# Patient Record
Sex: FEMALE | Race: WHITE | Employment: UNEMPLOYED | URBAN - METROPOLITAN AREA
[De-identification: names, ages, dates, MRNs, and addresses within clinical notes are randomized per-mention and may not be internally consistent; named-entity substitution may affect disease eponyms.]

---

## 2018-09-29 ENCOUNTER — APPOINTMENT (OUTPATIENT)
Dept: CT IMAGING | Age: 83
End: 2018-09-29
Payer: MEDICARE

## 2018-09-29 ENCOUNTER — HOSPITAL ENCOUNTER (EMERGENCY)
Age: 83
Discharge: NURSING, REHAB OR LONG-TERM CARE | End: 2018-09-29
Attending: EMERGENCY MEDICINE | Admitting: EMERGENCY MEDICINE
Payer: MEDICARE

## 2018-09-29 VITALS
RESPIRATION RATE: 16 BRPM | DIASTOLIC BLOOD PRESSURE: 61 MMHG | HEIGHT: 65 IN | WEIGHT: 150 LBS | BODY MASS INDEX: 24.99 KG/M2 | TEMPERATURE: 98.3 F | OXYGEN SATURATION: 95 % | SYSTOLIC BLOOD PRESSURE: 127 MMHG | HEART RATE: 71 BPM

## 2018-09-29 DIAGNOSIS — E80.6 HYPERBILIRUBINEMIA: Primary | ICD-10-CM

## 2018-09-29 LAB
A/G RATIO: 1 (ref 1.1–2.2)
ALBUMIN SERPL-MCNC: 3.1 G/DL (ref 3.4–5)
ALP BLD-CCNC: 253 U/L (ref 40–129)
ALT SERPL-CCNC: 131 U/L (ref 10–40)
ANION GAP SERPL CALCULATED.3IONS-SCNC: 12 MMOL/L (ref 3–16)
AST SERPL-CCNC: 101 U/L (ref 15–37)
BILIRUB SERPL-MCNC: 2.4 MG/DL (ref 0–1)
BUN BLDV-MCNC: 20 MG/DL (ref 7–20)
CALCIUM SERPL-MCNC: 8.4 MG/DL (ref 8.3–10.6)
CHLORIDE BLD-SCNC: 102 MMOL/L (ref 99–110)
CO2: 26 MMOL/L (ref 21–32)
CREAT SERPL-MCNC: 0.9 MG/DL (ref 0.6–1.2)
GFR AFRICAN AMERICAN: >60
GFR NON-AFRICAN AMERICAN: 58
GLOBULIN: 3.1 G/DL
GLUCOSE BLD-MCNC: 129 MG/DL (ref 70–99)
HCT VFR BLD CALC: 37.2 % (ref 36–48)
HEMOGLOBIN: 12.4 G/DL (ref 12–16)
LIPASE: 34 U/L (ref 13–60)
MCH RBC QN AUTO: 31.4 PG (ref 26–34)
MCHC RBC AUTO-ENTMCNC: 33.3 G/DL (ref 31–36)
MCV RBC AUTO: 94.3 FL (ref 80–100)
PDW BLD-RTO: 14.3 % (ref 12.4–15.4)
PLATELET # BLD: 210 K/UL (ref 135–450)
PMV BLD AUTO: 9.4 FL (ref 5–10.5)
POTASSIUM SERPL-SCNC: 3.3 MMOL/L (ref 3.5–5.1)
RBC # BLD: 3.94 M/UL (ref 4–5.2)
SODIUM BLD-SCNC: 140 MMOL/L (ref 136–145)
TOTAL PROTEIN: 6.2 G/DL (ref 6.4–8.2)
WBC # BLD: 7.4 K/UL (ref 4–11)

## 2018-09-29 PROCEDURE — 99285 EMERGENCY DEPT VISIT HI MDM: CPT

## 2018-09-29 PROCEDURE — 85027 COMPLETE CBC AUTOMATED: CPT

## 2018-09-29 PROCEDURE — 83690 ASSAY OF LIPASE: CPT

## 2018-09-29 PROCEDURE — 80053 COMPREHEN METABOLIC PANEL: CPT

## 2018-09-29 PROCEDURE — 80074 ACUTE HEPATITIS PANEL: CPT

## 2018-09-29 PROCEDURE — 74176 CT ABD & PELVIS W/O CONTRAST: CPT

## 2018-09-29 ASSESSMENT — PAIN SCALES - GENERAL
PAINLEVEL_OUTOF10: 4
PAINLEVEL_OUTOF10: 0

## 2018-09-29 ASSESSMENT — PAIN SCALES - WONG BAKER: WONGBAKER_NUMERICALRESPONSE: 0

## 2018-09-29 ASSESSMENT — PAIN DESCRIPTION - PAIN TYPE: TYPE: ACUTE PAIN

## 2018-09-29 NOTE — ED PROVIDER NOTES
Cholecystectomy. Common bowel duct is mildly dilated measuring up to 1.2 cm. High density material noted within the distal common bile duct which may represent tiny stones. GI/Bowel: No findings to suggest bowel obstruction. Appendix is not clearly visualized however no findings to suggest acute appendicitis. Pelvis: Streak artifact from left hip arthroplasty limits evaluation of the pelvis. No large fluid collection. No adenopathy. Vascular calcifications. Peritoneum/Retroperitoneum: No abdominal aortic aneurysm. Adrenal glands unremarkable. Right kidney is unremarkable. Left kidney is unremarkable. Bones/Soft Tissues: Osteopenia. Moderate severe degenerative changes at the right hip. Postsurgical changes from left hip arthroplasty. No evidence of fracture or dislocation of the left hip. Moderate disc height loss L5-S1. Mild-to-moderate disc height loss L1 through L4. Vacuum disc L1 through L5. Common bile duct is mildly dilated. Increased densities within the distal common bile duct which likely represent small stones. This can be better evaluated with MRCP. No acute findings otherwise.        Labs:  Results for orders placed or performed during the hospital encounter of 09/29/18   CBC   Result Value Ref Range    WBC 7.4 4.0 - 11.0 K/uL    RBC 3.94 (L) 4.00 - 5.20 M/uL    Hemoglobin 12.4 12.0 - 16.0 g/dL    Hematocrit 37.2 36.0 - 48.0 %    MCV 94.3 80.0 - 100.0 fL    MCH 31.4 26.0 - 34.0 pg    MCHC 33.3 31.0 - 36.0 g/dL    RDW 14.3 12.4 - 15.4 %    Platelets 760 162 - 562 K/uL    MPV 9.4 5.0 - 10.5 fL   Comprehensive metabolic panel   Result Value Ref Range    Sodium 140 136 - 145 mmol/L    Potassium 3.3 (L) 3.5 - 5.1 mmol/L    Chloride 102 99 - 110 mmol/L    CO2 26 21 - 32 mmol/L    Anion Gap 12 3 - 16    Glucose 129 (H) 70 - 99 mg/dL    BUN 20 7 - 20 mg/dL    CREATININE 0.9 0.6 - 1.2 mg/dL    GFR Non-African American 58 (A) >60    GFR African American >60 >60    Calcium 8.4 8.3 - 10.6 mg/dL

## 2018-09-30 LAB
HAV IGM SER IA-ACNC: NORMAL
HEPATITIS B CORE IGM ANTIBODY: NORMAL
HEPATITIS B SURFACE ANTIGEN INTERPRETATION: NORMAL
HEPATITIS C ANTIBODY INTERPRETATION: NORMAL

## 2019-10-28 ENCOUNTER — HOSPITAL ENCOUNTER (INPATIENT)
Age: 84
LOS: 7 days | Discharge: HOSPICE | DRG: 871 | End: 2019-11-04
Attending: EMERGENCY MEDICINE | Admitting: HOSPITALIST
Payer: MEDICARE

## 2019-10-28 ENCOUNTER — APPOINTMENT (OUTPATIENT)
Dept: GENERAL RADIOLOGY | Age: 84
DRG: 871 | End: 2019-10-28
Payer: MEDICARE

## 2019-10-28 ENCOUNTER — APPOINTMENT (OUTPATIENT)
Dept: CT IMAGING | Age: 84
DRG: 871 | End: 2019-10-28
Payer: MEDICARE

## 2019-10-28 DIAGNOSIS — N30.91 CYSTITIS WITH HEMATURIA: ICD-10-CM

## 2019-10-28 DIAGNOSIS — J96.02 ACUTE RESPIRATORY FAILURE WITH HYPOXIA AND HYPERCAPNIA (HCC): ICD-10-CM

## 2019-10-28 DIAGNOSIS — A41.9 SEPTICEMIA (HCC): Primary | ICD-10-CM

## 2019-10-28 DIAGNOSIS — Z51.5 HOSPICE CARE: ICD-10-CM

## 2019-10-28 DIAGNOSIS — J96.01 ACUTE RESPIRATORY FAILURE WITH HYPOXIA AND HYPERCAPNIA (HCC): ICD-10-CM

## 2019-10-28 LAB
A/G RATIO: 1.1 (ref 1.1–2.2)
ALBUMIN SERPL-MCNC: 4.1 G/DL (ref 3.4–5)
ALP BLD-CCNC: 165 U/L (ref 40–129)
ALT SERPL-CCNC: 33 U/L (ref 10–40)
AMORPHOUS: ABNORMAL /HPF
ANION GAP SERPL CALCULATED.3IONS-SCNC: 16 MMOL/L (ref 3–16)
AST SERPL-CCNC: 35 U/L (ref 15–37)
BACTERIA: ABNORMAL /HPF
BASE EXCESS VENOUS: -1.5 MMOL/L (ref -3–3)
BASOPHILS ABSOLUTE: 0.1 K/UL (ref 0–0.2)
BASOPHILS RELATIVE PERCENT: 0.7 %
BILIRUB SERPL-MCNC: 0.8 MG/DL (ref 0–1)
BILIRUBIN URINE: NEGATIVE
BLOOD, URINE: ABNORMAL
BUN BLDV-MCNC: 18 MG/DL (ref 7–20)
CALCIUM SERPL-MCNC: 9.5 MG/DL (ref 8.3–10.6)
CARBOXYHEMOGLOBIN: 2.2 % (ref 0–1.5)
CHLORIDE BLD-SCNC: 103 MMOL/L (ref 99–110)
CLARITY: ABNORMAL
CO2: 25 MMOL/L (ref 21–32)
COLOR: YELLOW
CREAT SERPL-MCNC: 1.1 MG/DL (ref 0.6–1.2)
CRYSTALS, UA: ABNORMAL /HPF
EKG ATRIAL RATE: 125 BPM
EKG ATRIAL RATE: 129 BPM
EKG DIAGNOSIS: NORMAL
EKG DIAGNOSIS: NORMAL
EKG Q-T INTERVAL: 306 MS
EKG Q-T INTERVAL: 346 MS
EKG QRS DURATION: 82 MS
EKG QRS DURATION: 88 MS
EKG QTC CALCULATION (BAZETT): 446 MS
EKG QTC CALCULATION (BAZETT): 502 MS
EKG R AXIS: -17 DEGREES
EKG R AXIS: -23 DEGREES
EKG T AXIS: 52 DEGREES
EKG T AXIS: 67 DEGREES
EKG VENTRICULAR RATE: 127 BPM
EKG VENTRICULAR RATE: 128 BPM
EOSINOPHILS ABSOLUTE: 0.1 K/UL (ref 0–0.6)
EOSINOPHILS RELATIVE PERCENT: 0.3 %
EPITHELIAL CELLS, UA: ABNORMAL /HPF
GFR AFRICAN AMERICAN: 56
GFR NON-AFRICAN AMERICAN: 46
GLOBULIN: 3.6 G/DL
GLUCOSE BLD-MCNC: 193 MG/DL (ref 70–99)
GLUCOSE URINE: NEGATIVE MG/DL
HCO3 VENOUS: 25.2 MMOL/L (ref 23–29)
HCT VFR BLD CALC: 44.1 % (ref 36–48)
HEMOGLOBIN: 14.6 G/DL (ref 12–16)
KETONES, URINE: 15 MG/DL
LACTIC ACID: 3.2 MMOL/L (ref 0.4–2)
LACTIC ACID: 3.5 MMOL/L (ref 0.4–2)
LEUKOCYTE ESTERASE, URINE: ABNORMAL
LYMPHOCYTES ABSOLUTE: 2.1 K/UL (ref 1–5.1)
LYMPHOCYTES RELATIVE PERCENT: 11.5 %
MCH RBC QN AUTO: 32.9 PG (ref 26–34)
MCHC RBC AUTO-ENTMCNC: 33 G/DL (ref 31–36)
MCV RBC AUTO: 99.6 FL (ref 80–100)
METHEMOGLOBIN VENOUS: 0.4 %
MICROSCOPIC EXAMINATION: YES
MONOCYTES ABSOLUTE: 1 K/UL (ref 0–1.3)
MONOCYTES RELATIVE PERCENT: 5.7 %
MUCUS: ABNORMAL /LPF
NEUTROPHILS ABSOLUTE: 15.2 K/UL (ref 1.7–7.7)
NEUTROPHILS RELATIVE PERCENT: 81.8 %
NITRITE, URINE: POSITIVE
O2 CONTENT, VEN: 15 VOL %
O2 SAT, VEN: 73 %
O2 THERAPY: ABNORMAL
PCO2, VEN: 50 MMHG (ref 40–50)
PDW BLD-RTO: 13.6 % (ref 12.4–15.4)
PH UA: 7 (ref 5–8)
PH VENOUS: 7.32 (ref 7.35–7.45)
PLATELET # BLD: 270 K/UL (ref 135–450)
PMV BLD AUTO: 9.9 FL (ref 5–10.5)
PO2, VEN: 42 MMHG (ref 25–40)
POTASSIUM REFLEX MAGNESIUM: 4.3 MMOL/L (ref 3.5–5.1)
PRO-BNP: 758 PG/ML (ref 0–449)
PROCALCITONIN: 0.12 NG/ML (ref 0–0.15)
PROTEIN UA: 100 MG/DL
RAPID INFLUENZA  B AGN: NEGATIVE
RAPID INFLUENZA A AGN: NEGATIVE
RBC # BLD: 4.43 M/UL (ref 4–5.2)
RBC UA: ABNORMAL /HPF (ref 0–2)
SODIUM BLD-SCNC: 144 MMOL/L (ref 136–145)
SPECIFIC GRAVITY UA: 1.02 (ref 1–1.03)
TCO2 CALC VENOUS: 27 MMOL/L
TOTAL PROTEIN: 7.7 G/DL (ref 6.4–8.2)
TROPONIN: <0.01 NG/ML
URINE REFLEX TO CULTURE: YES
URINE TYPE: ABNORMAL
UROBILINOGEN, URINE: 0.2 E.U./DL
WBC # BLD: 18.5 K/UL (ref 4–11)
WBC UA: ABNORMAL /HPF (ref 0–5)

## 2019-10-28 PROCEDURE — 85025 COMPLETE CBC W/AUTO DIFF WBC: CPT

## 2019-10-28 PROCEDURE — 84484 ASSAY OF TROPONIN QUANT: CPT

## 2019-10-28 PROCEDURE — 94640 AIRWAY INHALATION TREATMENT: CPT

## 2019-10-28 PROCEDURE — 96366 THER/PROPH/DIAG IV INF ADDON: CPT

## 2019-10-28 PROCEDURE — 87086 URINE CULTURE/COLONY COUNT: CPT

## 2019-10-28 PROCEDURE — 87186 SC STD MICRODIL/AGAR DIL: CPT

## 2019-10-28 PROCEDURE — 71045 X-RAY EXAM CHEST 1 VIEW: CPT

## 2019-10-28 PROCEDURE — 99223 1ST HOSP IP/OBS HIGH 75: CPT | Performed by: INTERNAL MEDICINE

## 2019-10-28 PROCEDURE — 93005 ELECTROCARDIOGRAM TRACING: CPT | Performed by: EMERGENCY MEDICINE

## 2019-10-28 PROCEDURE — 2580000003 HC RX 258: Performed by: INTERNAL MEDICINE

## 2019-10-28 PROCEDURE — 82803 BLOOD GASES ANY COMBINATION: CPT

## 2019-10-28 PROCEDURE — 2580000003 HC RX 258: Performed by: EMERGENCY MEDICINE

## 2019-10-28 PROCEDURE — 87040 BLOOD CULTURE FOR BACTERIA: CPT

## 2019-10-28 PROCEDURE — 2580000003 HC RX 258: Performed by: HOSPITALIST

## 2019-10-28 PROCEDURE — 94761 N-INVAS EAR/PLS OXIMETRY MLT: CPT

## 2019-10-28 PROCEDURE — 6370000000 HC RX 637 (ALT 250 FOR IP): Performed by: INTERNAL MEDICINE

## 2019-10-28 PROCEDURE — 80053 COMPREHEN METABOLIC PANEL: CPT

## 2019-10-28 PROCEDURE — 6370000000 HC RX 637 (ALT 250 FOR IP): Performed by: HOSPITALIST

## 2019-10-28 PROCEDURE — 6360000002 HC RX W HCPCS: Performed by: HOSPITALIST

## 2019-10-28 PROCEDURE — 87077 CULTURE AEROBIC IDENTIFY: CPT

## 2019-10-28 PROCEDURE — 2700000000 HC OXYGEN THERAPY PER DAY

## 2019-10-28 PROCEDURE — 94660 CPAP INITIATION&MGMT: CPT

## 2019-10-28 PROCEDURE — 6360000002 HC RX W HCPCS: Performed by: EMERGENCY MEDICINE

## 2019-10-28 PROCEDURE — 96365 THER/PROPH/DIAG IV INF INIT: CPT

## 2019-10-28 PROCEDURE — 6360000002 HC RX W HCPCS: Performed by: INTERNAL MEDICINE

## 2019-10-28 PROCEDURE — 93010 ELECTROCARDIOGRAM REPORT: CPT | Performed by: INTERNAL MEDICINE

## 2019-10-28 PROCEDURE — 71260 CT THORAX DX C+: CPT

## 2019-10-28 PROCEDURE — 2000000000 HC ICU R&B

## 2019-10-28 PROCEDURE — 84145 PROCALCITONIN (PCT): CPT

## 2019-10-28 PROCEDURE — 81001 URINALYSIS AUTO W/SCOPE: CPT

## 2019-10-28 PROCEDURE — 87804 INFLUENZA ASSAY W/OPTIC: CPT

## 2019-10-28 PROCEDURE — 96367 TX/PROPH/DG ADDL SEQ IV INF: CPT

## 2019-10-28 PROCEDURE — 83605 ASSAY OF LACTIC ACID: CPT

## 2019-10-28 PROCEDURE — 6370000000 HC RX 637 (ALT 250 FOR IP): Performed by: EMERGENCY MEDICINE

## 2019-10-28 PROCEDURE — 6360000004 HC RX CONTRAST MEDICATION: Performed by: EMERGENCY MEDICINE

## 2019-10-28 PROCEDURE — 83880 ASSAY OF NATRIURETIC PEPTIDE: CPT

## 2019-10-28 PROCEDURE — 99285 EMERGENCY DEPT VISIT HI MDM: CPT

## 2019-10-28 RX ORDER — BISACODYL 10 MG
10 SUPPOSITORY, RECTAL RECTAL DAILY
Status: DISCONTINUED | OUTPATIENT
Start: 2019-10-28 | End: 2019-11-04 | Stop reason: HOSPADM

## 2019-10-28 RX ORDER — GUAIFENESIN/DEXTROMETHORPHAN 100-10MG/5
5 SYRUP ORAL EVERY 4 HOURS PRN
Status: DISCONTINUED | OUTPATIENT
Start: 2019-10-28 | End: 2019-10-30

## 2019-10-28 RX ORDER — GUAIFENESIN AND DEXTROMETHORPHAN HYDROBROMIDE 100; 10 MG/5ML; MG/5ML
5 SOLUTION ORAL EVERY 4 HOURS PRN
Status: ON HOLD | COMMUNITY
End: 2019-11-04 | Stop reason: HOSPADM

## 2019-10-28 RX ORDER — PYRIDOXINE HCL (VITAMIN B6) 100 MG
500 TABLET ORAL DAILY
Status: DISCONTINUED | OUTPATIENT
Start: 2019-10-28 | End: 2019-10-28

## 2019-10-28 RX ORDER — PANTOPRAZOLE SODIUM 40 MG/1
40 TABLET, DELAYED RELEASE ORAL
Status: DISCONTINUED | OUTPATIENT
Start: 2019-10-28 | End: 2019-11-04 | Stop reason: HOSPADM

## 2019-10-28 RX ORDER — FERROUS SULFATE 325(65) MG
325 TABLET ORAL 2 TIMES DAILY WITH MEALS
Status: DISCONTINUED | OUTPATIENT
Start: 2019-10-28 | End: 2019-11-04 | Stop reason: HOSPADM

## 2019-10-28 RX ORDER — ACETAMINOPHEN 650 MG/1
650 SUPPOSITORY RECTAL ONCE
Status: COMPLETED | OUTPATIENT
Start: 2019-10-28 | End: 2019-10-28

## 2019-10-28 RX ORDER — CETIRIZINE HYDROCHLORIDE 10 MG/1
5 TABLET ORAL DAILY
Status: DISCONTINUED | OUTPATIENT
Start: 2019-10-28 | End: 2019-11-04 | Stop reason: HOSPADM

## 2019-10-28 RX ORDER — SODIUM CHLORIDE 9 MG/ML
INJECTION, SOLUTION INTRAVENOUS CONTINUOUS
Status: DISCONTINUED | OUTPATIENT
Start: 2019-10-28 | End: 2019-10-29

## 2019-10-28 RX ORDER — IPRATROPIUM BROMIDE AND ALBUTEROL SULFATE 2.5; .5 MG/3ML; MG/3ML
1 SOLUTION RESPIRATORY (INHALATION) EVERY 4 HOURS
Status: DISCONTINUED | OUTPATIENT
Start: 2019-10-28 | End: 2019-10-28

## 2019-10-28 RX ORDER — IPRATROPIUM BROMIDE AND ALBUTEROL SULFATE 2.5; .5 MG/3ML; MG/3ML
1 SOLUTION RESPIRATORY (INHALATION) EVERY 4 HOURS
Status: DISCONTINUED | OUTPATIENT
Start: 2019-10-28 | End: 2019-11-02

## 2019-10-28 RX ORDER — DONEPEZIL HYDROCHLORIDE 5 MG/1
10 TABLET, FILM COATED ORAL DAILY
Status: DISCONTINUED | OUTPATIENT
Start: 2019-10-28 | End: 2019-11-04 | Stop reason: HOSPADM

## 2019-10-28 RX ORDER — MULTIVITAMIN
TABLET ORAL
Status: ON HOLD | COMMUNITY
End: 2019-11-04 | Stop reason: HOSPADM

## 2019-10-28 RX ORDER — ACETAMINOPHEN 325 MG/1
650 TABLET ORAL EVERY 4 HOURS PRN
Status: DISCONTINUED | OUTPATIENT
Start: 2019-10-28 | End: 2019-11-04 | Stop reason: HOSPADM

## 2019-10-28 RX ORDER — SODIUM CHLORIDE 0.9 % (FLUSH) 0.9 %
10 SYRINGE (ML) INJECTION EVERY 12 HOURS SCHEDULED
Status: DISCONTINUED | OUTPATIENT
Start: 2019-10-28 | End: 2019-11-04 | Stop reason: HOSPADM

## 2019-10-28 RX ORDER — HYDRALAZINE HYDROCHLORIDE 20 MG/ML
10 INJECTION INTRAMUSCULAR; INTRAVENOUS EVERY 6 HOURS PRN
Status: DISCONTINUED | OUTPATIENT
Start: 2019-10-28 | End: 2019-11-04 | Stop reason: HOSPADM

## 2019-10-28 RX ORDER — ONDANSETRON 2 MG/ML
4 INJECTION INTRAMUSCULAR; INTRAVENOUS EVERY 6 HOURS PRN
Status: DISCONTINUED | OUTPATIENT
Start: 2019-10-28 | End: 2019-10-28

## 2019-10-28 RX ORDER — SODIUM CHLORIDE 0.9 % (FLUSH) 0.9 %
10 SYRINGE (ML) INJECTION PRN
Status: DISCONTINUED | OUTPATIENT
Start: 2019-10-28 | End: 2019-11-04 | Stop reason: HOSPADM

## 2019-10-28 RX ORDER — DIVALPROEX SODIUM 250 MG/1
250 TABLET, EXTENDED RELEASE ORAL NIGHTLY
Status: DISCONTINUED | OUTPATIENT
Start: 2019-10-28 | End: 2019-11-04 | Stop reason: HOSPADM

## 2019-10-28 RX ORDER — CITALOPRAM 20 MG/1
10 TABLET ORAL DAILY
Status: DISCONTINUED | OUTPATIENT
Start: 2019-10-28 | End: 2019-11-04 | Stop reason: HOSPADM

## 2019-10-28 RX ORDER — MIRTAZAPINE 15 MG/1
15 TABLET, FILM COATED ORAL NIGHTLY
Status: DISCONTINUED | OUTPATIENT
Start: 2019-10-28 | End: 2019-11-04 | Stop reason: HOSPADM

## 2019-10-28 RX ORDER — LEVOTHYROXINE SODIUM 0.12 MG/1
125 TABLET ORAL DAILY
Status: DISCONTINUED | OUTPATIENT
Start: 2019-10-28 | End: 2019-11-04 | Stop reason: HOSPADM

## 2019-10-28 RX ORDER — ACETAMINOPHEN 325 MG/1
650 TABLET ORAL ONCE
Status: DISCONTINUED | OUTPATIENT
Start: 2019-10-28 | End: 2019-10-28

## 2019-10-28 RX ORDER — 0.9 % SODIUM CHLORIDE 0.9 %
30 INTRAVENOUS SOLUTION INTRAVENOUS ONCE
Status: COMPLETED | OUTPATIENT
Start: 2019-10-28 | End: 2019-10-28

## 2019-10-28 RX ORDER — ATORVASTATIN CALCIUM 10 MG/1
10 TABLET, FILM COATED ORAL DAILY
Status: DISCONTINUED | OUTPATIENT
Start: 2019-10-28 | End: 2019-11-04 | Stop reason: HOSPADM

## 2019-10-28 RX ORDER — DOCUSATE SODIUM 100 MG/1
100 CAPSULE, LIQUID FILLED ORAL DAILY
Status: DISCONTINUED | OUTPATIENT
Start: 2019-10-28 | End: 2019-11-04 | Stop reason: HOSPADM

## 2019-10-28 RX ADMIN — IPRATROPIUM BROMIDE AND ALBUTEROL SULFATE 3 ML: .5; 3 SOLUTION RESPIRATORY (INHALATION) at 22:35

## 2019-10-28 RX ADMIN — DIVALPROEX SODIUM 250 MG: 250 TABLET, EXTENDED RELEASE ORAL at 21:48

## 2019-10-28 RX ADMIN — MIRTAZAPINE 15 MG: 15 TABLET, FILM COATED ORAL at 21:48

## 2019-10-28 RX ADMIN — IPRATROPIUM BROMIDE AND ALBUTEROL SULFATE 3 ML: .5; 3 SOLUTION RESPIRATORY (INHALATION) at 07:17

## 2019-10-28 RX ADMIN — AZITHROMYCIN DIHYDRATE 500 MG: 500 INJECTION, POWDER, LYOPHILIZED, FOR SOLUTION INTRAVENOUS at 04:39

## 2019-10-28 RX ADMIN — MUPIROCIN: 20 OINTMENT TOPICAL at 21:48

## 2019-10-28 RX ADMIN — CEFTRIAXONE SODIUM 1 G: 1 INJECTION, POWDER, FOR SOLUTION INTRAMUSCULAR; INTRAVENOUS at 04:07

## 2019-10-28 RX ADMIN — Medication 10 ML: at 12:35

## 2019-10-28 RX ADMIN — ACETAMINOPHEN 650 MG: 650 SUPPOSITORY RECTAL at 05:18

## 2019-10-28 RX ADMIN — ENOXAPARIN SODIUM 30 MG: 30 INJECTION SUBCUTANEOUS at 12:34

## 2019-10-28 RX ADMIN — IPRATROPIUM BROMIDE AND ALBUTEROL SULFATE 3 ML: .5; 3 SOLUTION RESPIRATORY (INHALATION) at 15:31

## 2019-10-28 RX ADMIN — SODIUM CHLORIDE: 9 INJECTION, SOLUTION INTRAVENOUS at 19:33

## 2019-10-28 RX ADMIN — Medication 10 ML: at 21:48

## 2019-10-28 RX ADMIN — SODIUM CHLORIDE: 9 INJECTION, SOLUTION INTRAVENOUS at 07:39

## 2019-10-28 RX ADMIN — IPRATROPIUM BROMIDE AND ALBUTEROL SULFATE 3 ML: .5; 3 SOLUTION RESPIRATORY (INHALATION) at 11:01

## 2019-10-28 RX ADMIN — IPRATROPIUM BROMIDE AND ALBUTEROL SULFATE 3 ML: .5; 3 SOLUTION RESPIRATORY (INHALATION) at 19:15

## 2019-10-28 RX ADMIN — SODIUM CHLORIDE 2274 ML: 9 INJECTION, SOLUTION INTRAVENOUS at 04:39

## 2019-10-28 RX ADMIN — CEFEPIME 2 G: 2 INJECTION, POWDER, FOR SOLUTION INTRAVENOUS at 12:34

## 2019-10-28 RX ADMIN — IOPAMIDOL 75 ML: 755 INJECTION, SOLUTION INTRAVENOUS at 05:40

## 2019-10-28 RX ADMIN — VANCOMYCIN HYDROCHLORIDE 1000 MG: 1 INJECTION, POWDER, LYOPHILIZED, FOR SOLUTION INTRAVENOUS at 14:00

## 2019-10-28 ASSESSMENT — PAIN SCALES - GENERAL
PAINLEVEL_OUTOF10: 0
PAINLEVEL_OUTOF10: 0
PAINLEVEL_OUTOF10: 8

## 2019-10-28 ASSESSMENT — PAIN DESCRIPTION - LOCATION: LOCATION: GENERALIZED

## 2019-10-28 ASSESSMENT — PAIN SCALES - WONG BAKER: WONGBAKER_NUMERICALRESPONSE: 8

## 2019-10-28 ASSESSMENT — PAIN DESCRIPTION - DESCRIPTORS: DESCRIPTORS: ACHING

## 2019-10-29 LAB
ALBUMIN SERPL-MCNC: 3.1 G/DL (ref 3.4–5)
ALP BLD-CCNC: 93 U/L (ref 40–129)
ALT SERPL-CCNC: 20 U/L (ref 10–40)
ANION GAP SERPL CALCULATED.3IONS-SCNC: 11 MMOL/L (ref 3–16)
AST SERPL-CCNC: 22 U/L (ref 15–37)
BASOPHILS ABSOLUTE: 0.1 K/UL (ref 0–0.2)
BASOPHILS RELATIVE PERCENT: 0.5 %
BILIRUB SERPL-MCNC: 0.9 MG/DL (ref 0–1)
BILIRUBIN DIRECT: 0.4 MG/DL (ref 0–0.3)
BILIRUBIN, INDIRECT: 0.5 MG/DL (ref 0–1)
BUN BLDV-MCNC: 10 MG/DL (ref 7–20)
CALCIUM SERPL-MCNC: 8.3 MG/DL (ref 8.3–10.6)
CHLORIDE BLD-SCNC: 103 MMOL/L (ref 99–110)
CO2: 21 MMOL/L (ref 21–32)
CREAT SERPL-MCNC: 0.8 MG/DL (ref 0.6–1.2)
EKG ATRIAL RATE: 107 BPM
EKG DIAGNOSIS: NORMAL
EKG P AXIS: 44 DEGREES
EKG P-R INTERVAL: 172 MS
EKG Q-T INTERVAL: 368 MS
EKG QRS DURATION: 86 MS
EKG QTC CALCULATION (BAZETT): 491 MS
EKG R AXIS: -26 DEGREES
EKG T AXIS: 32 DEGREES
EKG VENTRICULAR RATE: 107 BPM
EOSINOPHILS ABSOLUTE: 0.1 K/UL (ref 0–0.6)
EOSINOPHILS RELATIVE PERCENT: 0.9 %
GFR AFRICAN AMERICAN: >60
GFR NON-AFRICAN AMERICAN: >60
GLUCOSE BLD-MCNC: 126 MG/DL (ref 70–99)
HCT VFR BLD CALC: 37.2 % (ref 36–48)
HEMOGLOBIN: 12.2 G/DL (ref 12–16)
LYMPHOCYTES ABSOLUTE: 1 K/UL (ref 1–5.1)
LYMPHOCYTES RELATIVE PERCENT: 10.5 %
MAGNESIUM: 1.8 MG/DL (ref 1.8–2.4)
MCH RBC QN AUTO: 32.6 PG (ref 26–34)
MCHC RBC AUTO-ENTMCNC: 32.8 G/DL (ref 31–36)
MCV RBC AUTO: 99.4 FL (ref 80–100)
MONOCYTES ABSOLUTE: 0.7 K/UL (ref 0–1.3)
MONOCYTES RELATIVE PERCENT: 7.2 %
NEUTROPHILS ABSOLUTE: 8 K/UL (ref 1.7–7.7)
NEUTROPHILS RELATIVE PERCENT: 80.9 %
PDW BLD-RTO: 13.4 % (ref 12.4–15.4)
PHOSPHORUS: 2.3 MG/DL (ref 2.5–4.9)
PLATELET # BLD: 194 K/UL (ref 135–450)
PMV BLD AUTO: 9.3 FL (ref 5–10.5)
POTASSIUM REFLEX MAGNESIUM: 3.4 MMOL/L (ref 3.5–5.1)
POTASSIUM SERPL-SCNC: 3.4 MMOL/L (ref 3.5–5.1)
RBC # BLD: 3.74 M/UL (ref 4–5.2)
SODIUM BLD-SCNC: 135 MMOL/L (ref 136–145)
TOTAL PROTEIN: 6 G/DL (ref 6.4–8.2)
WBC # BLD: 9.9 K/UL (ref 4–11)

## 2019-10-29 PROCEDURE — 84100 ASSAY OF PHOSPHORUS: CPT

## 2019-10-29 PROCEDURE — 94640 AIRWAY INHALATION TREATMENT: CPT

## 2019-10-29 PROCEDURE — 99233 SBSQ HOSP IP/OBS HIGH 50: CPT | Performed by: INTERNAL MEDICINE

## 2019-10-29 PROCEDURE — 2580000003 HC RX 258: Performed by: INTERNAL MEDICINE

## 2019-10-29 PROCEDURE — 93005 ELECTROCARDIOGRAM TRACING: CPT | Performed by: INTERNAL MEDICINE

## 2019-10-29 PROCEDURE — 36415 COLL VENOUS BLD VENIPUNCTURE: CPT

## 2019-10-29 PROCEDURE — 80048 BASIC METABOLIC PNL TOTAL CA: CPT

## 2019-10-29 PROCEDURE — 80076 HEPATIC FUNCTION PANEL: CPT

## 2019-10-29 PROCEDURE — 6370000000 HC RX 637 (ALT 250 FOR IP): Performed by: HOSPITALIST

## 2019-10-29 PROCEDURE — 92610 EVALUATE SWALLOWING FUNCTION: CPT

## 2019-10-29 PROCEDURE — 94761 N-INVAS EAR/PLS OXIMETRY MLT: CPT

## 2019-10-29 PROCEDURE — 6360000002 HC RX W HCPCS: Performed by: HOSPITALIST

## 2019-10-29 PROCEDURE — 6360000002 HC RX W HCPCS: Performed by: INTERNAL MEDICINE

## 2019-10-29 PROCEDURE — 92526 ORAL FUNCTION THERAPY: CPT

## 2019-10-29 PROCEDURE — 2580000003 HC RX 258: Performed by: HOSPITALIST

## 2019-10-29 PROCEDURE — 6370000000 HC RX 637 (ALT 250 FOR IP): Performed by: INTERNAL MEDICINE

## 2019-10-29 PROCEDURE — 83735 ASSAY OF MAGNESIUM: CPT

## 2019-10-29 PROCEDURE — 1200000000 HC SEMI PRIVATE

## 2019-10-29 PROCEDURE — 2700000000 HC OXYGEN THERAPY PER DAY

## 2019-10-29 PROCEDURE — 99232 SBSQ HOSP IP/OBS MODERATE 35: CPT | Performed by: INTERNAL MEDICINE

## 2019-10-29 PROCEDURE — 85025 COMPLETE CBC W/AUTO DIFF WBC: CPT

## 2019-10-29 PROCEDURE — 93010 ELECTROCARDIOGRAM REPORT: CPT | Performed by: INTERNAL MEDICINE

## 2019-10-29 RX ORDER — LEVOFLOXACIN 5 MG/ML
500 INJECTION, SOLUTION INTRAVENOUS EVERY 24 HOURS
Status: DISCONTINUED | OUTPATIENT
Start: 2019-10-29 | End: 2019-10-30

## 2019-10-29 RX ORDER — SODIUM CHLORIDE 9 MG/ML
INJECTION, SOLUTION INTRAVENOUS CONTINUOUS
Status: DISCONTINUED | OUTPATIENT
Start: 2019-10-29 | End: 2019-10-30

## 2019-10-29 RX ADMIN — IPRATROPIUM BROMIDE AND ALBUTEROL SULFATE 3 ML: .5; 3 SOLUTION RESPIRATORY (INHALATION) at 07:18

## 2019-10-29 RX ADMIN — PANTOPRAZOLE SODIUM 40 MG: 40 TABLET, DELAYED RELEASE ORAL at 17:00

## 2019-10-29 RX ADMIN — PANTOPRAZOLE SODIUM 40 MG: 40 TABLET, DELAYED RELEASE ORAL at 06:25

## 2019-10-29 RX ADMIN — SODIUM CHLORIDE: 9 INJECTION, SOLUTION INTRAVENOUS at 04:30

## 2019-10-29 RX ADMIN — SODIUM CHLORIDE: 9 INJECTION, SOLUTION INTRAVENOUS at 14:48

## 2019-10-29 RX ADMIN — ENOXAPARIN SODIUM 30 MG: 30 INJECTION SUBCUTANEOUS at 07:33

## 2019-10-29 RX ADMIN — FERROUS SULFATE TAB 325 MG (65 MG ELEMENTAL FE) 325 MG: 325 (65 FE) TAB at 17:00

## 2019-10-29 RX ADMIN — MUPIROCIN: 20 OINTMENT TOPICAL at 07:33

## 2019-10-29 RX ADMIN — IPRATROPIUM BROMIDE AND ALBUTEROL SULFATE 3 ML: .5; 3 SOLUTION RESPIRATORY (INHALATION) at 15:57

## 2019-10-29 RX ADMIN — SODIUM CHLORIDE: 9 INJECTION, SOLUTION INTRAVENOUS at 17:01

## 2019-10-29 RX ADMIN — DIVALPROEX SODIUM 250 MG: 250 TABLET, EXTENDED RELEASE ORAL at 20:42

## 2019-10-29 RX ADMIN — IPRATROPIUM BROMIDE AND ALBUTEROL SULFATE 3 ML: .5; 3 SOLUTION RESPIRATORY (INHALATION) at 02:55

## 2019-10-29 RX ADMIN — Medication 10 ML: at 20:37

## 2019-10-29 RX ADMIN — MIRTAZAPINE 15 MG: 15 TABLET, FILM COATED ORAL at 20:42

## 2019-10-29 RX ADMIN — LEVOFLOXACIN 500 MG: 5 INJECTION, SOLUTION INTRAVENOUS at 11:39

## 2019-10-29 RX ADMIN — IPRATROPIUM BROMIDE AND ALBUTEROL SULFATE 3 ML: .5; 3 SOLUTION RESPIRATORY (INHALATION) at 19:22

## 2019-10-29 RX ADMIN — Medication 10 ML: at 07:33

## 2019-10-29 RX ADMIN — IPRATROPIUM BROMIDE AND ALBUTEROL SULFATE 3 ML: .5; 3 SOLUTION RESPIRATORY (INHALATION) at 11:12

## 2019-10-29 RX ADMIN — MUPIROCIN: 20 OINTMENT TOPICAL at 20:42

## 2019-10-29 ASSESSMENT — PAIN SCALES - GENERAL: PAINLEVEL_OUTOF10: 0

## 2019-10-30 ENCOUNTER — APPOINTMENT (OUTPATIENT)
Dept: GENERAL RADIOLOGY | Age: 84
DRG: 871 | End: 2019-10-30
Payer: MEDICARE

## 2019-10-30 LAB
ANION GAP SERPL CALCULATED.3IONS-SCNC: 11 MMOL/L (ref 3–16)
BASOPHILS ABSOLUTE: 0 K/UL (ref 0–0.2)
BASOPHILS RELATIVE PERCENT: 0.5 %
BUN BLDV-MCNC: 8 MG/DL (ref 7–20)
CALCIUM SERPL-MCNC: 8.4 MG/DL (ref 8.3–10.6)
CHLORIDE BLD-SCNC: 105 MMOL/L (ref 99–110)
CO2: 25 MMOL/L (ref 21–32)
CREAT SERPL-MCNC: 0.8 MG/DL (ref 0.6–1.2)
EOSINOPHILS ABSOLUTE: 0.1 K/UL (ref 0–0.6)
EOSINOPHILS RELATIVE PERCENT: 2.1 %
GFR AFRICAN AMERICAN: >60
GFR NON-AFRICAN AMERICAN: >60
GLUCOSE BLD-MCNC: 123 MG/DL (ref 70–99)
HCT VFR BLD CALC: 38.3 % (ref 36–48)
HEMOGLOBIN: 12.6 G/DL (ref 12–16)
LYMPHOCYTES ABSOLUTE: 0.8 K/UL (ref 1–5.1)
LYMPHOCYTES RELATIVE PERCENT: 11.8 %
MAGNESIUM: 1.8 MG/DL (ref 1.8–2.4)
MCH RBC QN AUTO: 32.5 PG (ref 26–34)
MCHC RBC AUTO-ENTMCNC: 32.9 G/DL (ref 31–36)
MCV RBC AUTO: 98.8 FL (ref 80–100)
MONOCYTES ABSOLUTE: 0.7 K/UL (ref 0–1.3)
MONOCYTES RELATIVE PERCENT: 10.6 %
NEUTROPHILS ABSOLUTE: 5.2 K/UL (ref 1.7–7.7)
NEUTROPHILS RELATIVE PERCENT: 75 %
PDW BLD-RTO: 13 % (ref 12.4–15.4)
PHOSPHORUS: 2 MG/DL (ref 2.5–4.9)
PLATELET # BLD: 208 K/UL (ref 135–450)
PMV BLD AUTO: 9 FL (ref 5–10.5)
POTASSIUM SERPL-SCNC: 3.8 MMOL/L (ref 3.5–5.1)
RBC # BLD: 3.88 M/UL (ref 4–5.2)
SODIUM BLD-SCNC: 141 MMOL/L (ref 136–145)
WBC # BLD: 6.9 K/UL (ref 4–11)

## 2019-10-30 PROCEDURE — 99232 SBSQ HOSP IP/OBS MODERATE 35: CPT | Performed by: INTERNAL MEDICINE

## 2019-10-30 PROCEDURE — 2700000000 HC OXYGEN THERAPY PER DAY

## 2019-10-30 PROCEDURE — 83735 ASSAY OF MAGNESIUM: CPT

## 2019-10-30 PROCEDURE — 2580000003 HC RX 258: Performed by: INTERNAL MEDICINE

## 2019-10-30 PROCEDURE — 80048 BASIC METABOLIC PNL TOTAL CA: CPT

## 2019-10-30 PROCEDURE — 97166 OT EVAL MOD COMPLEX 45 MIN: CPT

## 2019-10-30 PROCEDURE — 6370000000 HC RX 637 (ALT 250 FOR IP): Performed by: INTERNAL MEDICINE

## 2019-10-30 PROCEDURE — 99233 SBSQ HOSP IP/OBS HIGH 50: CPT | Performed by: INTERNAL MEDICINE

## 2019-10-30 PROCEDURE — 84100 ASSAY OF PHOSPHORUS: CPT

## 2019-10-30 PROCEDURE — 85025 COMPLETE CBC W/AUTO DIFF WBC: CPT

## 2019-10-30 PROCEDURE — 94669 MECHANICAL CHEST WALL OSCILL: CPT

## 2019-10-30 PROCEDURE — 94640 AIRWAY INHALATION TREATMENT: CPT

## 2019-10-30 PROCEDURE — 1200000000 HC SEMI PRIVATE

## 2019-10-30 PROCEDURE — 6360000002 HC RX W HCPCS: Performed by: INTERNAL MEDICINE

## 2019-10-30 PROCEDURE — 97530 THERAPEUTIC ACTIVITIES: CPT

## 2019-10-30 PROCEDURE — 36415 COLL VENOUS BLD VENIPUNCTURE: CPT

## 2019-10-30 PROCEDURE — 92526 ORAL FUNCTION THERAPY: CPT

## 2019-10-30 PROCEDURE — 97161 PT EVAL LOW COMPLEX 20 MIN: CPT

## 2019-10-30 PROCEDURE — 94761 N-INVAS EAR/PLS OXIMETRY MLT: CPT

## 2019-10-30 PROCEDURE — 71045 X-RAY EXAM CHEST 1 VIEW: CPT

## 2019-10-30 RX ORDER — GUAIFENESIN 600 MG/1
600 TABLET, EXTENDED RELEASE ORAL 2 TIMES DAILY
Status: DISCONTINUED | OUTPATIENT
Start: 2019-10-30 | End: 2019-11-04 | Stop reason: HOSPADM

## 2019-10-30 RX ORDER — FUROSEMIDE 10 MG/ML
40 INJECTION INTRAMUSCULAR; INTRAVENOUS ONCE
Status: COMPLETED | OUTPATIENT
Start: 2019-10-30 | End: 2019-10-30

## 2019-10-30 RX ORDER — LEVOFLOXACIN 500 MG/1
500 TABLET, FILM COATED ORAL DAILY
Status: DISCONTINUED | OUTPATIENT
Start: 2019-10-31 | End: 2019-10-31

## 2019-10-30 RX ADMIN — IPRATROPIUM BROMIDE AND ALBUTEROL SULFATE 3 ML: .5; 3 SOLUTION RESPIRATORY (INHALATION) at 19:25

## 2019-10-30 RX ADMIN — MUPIROCIN: 20 OINTMENT TOPICAL at 09:12

## 2019-10-30 RX ADMIN — Medication 10 ML: at 09:13

## 2019-10-30 RX ADMIN — CETIRIZINE HYDROCHLORIDE 5 MG: 10 TABLET ORAL at 09:13

## 2019-10-30 RX ADMIN — FERROUS SULFATE TAB 325 MG (65 MG ELEMENTAL FE) 325 MG: 325 (65 FE) TAB at 16:54

## 2019-10-30 RX ADMIN — DIVALPROEX SODIUM 250 MG: 250 TABLET, EXTENDED RELEASE ORAL at 21:34

## 2019-10-30 RX ADMIN — LEVOFLOXACIN 500 MG: 5 INJECTION, SOLUTION INTRAVENOUS at 11:53

## 2019-10-30 RX ADMIN — IPRATROPIUM BROMIDE AND ALBUTEROL SULFATE 3 ML: .5; 3 SOLUTION RESPIRATORY (INHALATION) at 07:27

## 2019-10-30 RX ADMIN — Medication 10 ML: at 21:34

## 2019-10-30 RX ADMIN — GUAIFENESIN 600 MG: 600 TABLET, EXTENDED RELEASE ORAL at 09:20

## 2019-10-30 RX ADMIN — ENOXAPARIN SODIUM 30 MG: 30 INJECTION SUBCUTANEOUS at 09:16

## 2019-10-30 RX ADMIN — ATORVASTATIN CALCIUM 10 MG: 10 TABLET, FILM COATED ORAL at 09:13

## 2019-10-30 RX ADMIN — PANTOPRAZOLE SODIUM 40 MG: 40 TABLET, DELAYED RELEASE ORAL at 06:30

## 2019-10-30 RX ADMIN — DOCUSATE SODIUM 100 MG: 100 CAPSULE, LIQUID FILLED ORAL at 09:13

## 2019-10-30 RX ADMIN — SODIUM CHLORIDE: 9 INJECTION, SOLUTION INTRAVENOUS at 00:37

## 2019-10-30 RX ADMIN — GUAIFENESIN 600 MG: 600 TABLET, EXTENDED RELEASE ORAL at 21:34

## 2019-10-30 RX ADMIN — IPRATROPIUM BROMIDE AND ALBUTEROL SULFATE 3 ML: .5; 3 SOLUTION RESPIRATORY (INHALATION) at 14:54

## 2019-10-30 RX ADMIN — FUROSEMIDE 40 MG: 10 INJECTION, SOLUTION INTRAMUSCULAR; INTRAVENOUS at 14:30

## 2019-10-30 RX ADMIN — IPRATROPIUM BROMIDE AND ALBUTEROL SULFATE 3 ML: .5; 3 SOLUTION RESPIRATORY (INHALATION) at 11:02

## 2019-10-30 RX ADMIN — LEVOTHYROXINE SODIUM 125 MCG: 125 TABLET ORAL at 09:12

## 2019-10-30 RX ADMIN — MIRTAZAPINE 15 MG: 15 TABLET, FILM COATED ORAL at 21:34

## 2019-10-30 RX ADMIN — IPRATROPIUM BROMIDE AND ALBUTEROL SULFATE 3 ML: .5; 3 SOLUTION RESPIRATORY (INHALATION) at 00:00

## 2019-10-30 RX ADMIN — BISACODYL 10 MG: 10 SUPPOSITORY RECTAL at 09:12

## 2019-10-30 RX ADMIN — IPRATROPIUM BROMIDE AND ALBUTEROL SULFATE 3 ML: .5; 3 SOLUTION RESPIRATORY (INHALATION) at 02:46

## 2019-10-30 RX ADMIN — DONEPEZIL HYDROCHLORIDE 10 MG: 5 TABLET, FILM COATED ORAL at 09:13

## 2019-10-30 RX ADMIN — IPRATROPIUM BROMIDE AND ALBUTEROL SULFATE 3 ML: .5; 3 SOLUTION RESPIRATORY (INHALATION) at 22:51

## 2019-10-30 RX ADMIN — CITALOPRAM HYDROBROMIDE 10 MG: 20 TABLET ORAL at 09:12

## 2019-10-30 RX ADMIN — PANTOPRAZOLE SODIUM 40 MG: 40 TABLET, DELAYED RELEASE ORAL at 16:54

## 2019-10-30 RX ADMIN — FERROUS SULFATE TAB 325 MG (65 MG ELEMENTAL FE) 325 MG: 325 (65 FE) TAB at 09:13

## 2019-10-31 ENCOUNTER — APPOINTMENT (OUTPATIENT)
Dept: GENERAL RADIOLOGY | Age: 84
DRG: 871 | End: 2019-10-31
Payer: MEDICARE

## 2019-10-31 LAB
ANION GAP SERPL CALCULATED.3IONS-SCNC: 13 MMOL/L (ref 3–16)
BASOPHILS ABSOLUTE: 0 K/UL (ref 0–0.2)
BASOPHILS RELATIVE PERCENT: 0.4 %
BUN BLDV-MCNC: 21 MG/DL (ref 7–20)
CALCIUM SERPL-MCNC: 8.3 MG/DL (ref 8.3–10.6)
CHLORIDE BLD-SCNC: 106 MMOL/L (ref 99–110)
CO2: 22 MMOL/L (ref 21–32)
CREAT SERPL-MCNC: 1.1 MG/DL (ref 0.6–1.2)
EOSINOPHILS ABSOLUTE: 0.2 K/UL (ref 0–0.6)
EOSINOPHILS RELATIVE PERCENT: 1.5 %
GFR AFRICAN AMERICAN: 56
GFR NON-AFRICAN AMERICAN: 46
GLUCOSE BLD-MCNC: 125 MG/DL (ref 70–99)
HCT VFR BLD CALC: 37.2 % (ref 36–48)
HEMOGLOBIN: 12.4 G/DL (ref 12–16)
LYMPHOCYTES ABSOLUTE: 0.9 K/UL (ref 1–5.1)
LYMPHOCYTES RELATIVE PERCENT: 8.4 %
MAGNESIUM: 2 MG/DL (ref 1.8–2.4)
MCH RBC QN AUTO: 32.7 PG (ref 26–34)
MCHC RBC AUTO-ENTMCNC: 33.2 G/DL (ref 31–36)
MCV RBC AUTO: 98.6 FL (ref 80–100)
MONOCYTES ABSOLUTE: 1 K/UL (ref 0–1.3)
MONOCYTES RELATIVE PERCENT: 9.7 %
NEUTROPHILS ABSOLUTE: 8.3 K/UL (ref 1.7–7.7)
NEUTROPHILS RELATIVE PERCENT: 80 %
ORGANISM: ABNORMAL
PDW BLD-RTO: 13.3 % (ref 12.4–15.4)
PHOSPHORUS: 2.4 MG/DL (ref 2.5–4.9)
PLATELET # BLD: 241 K/UL (ref 135–450)
PMV BLD AUTO: 9.1 FL (ref 5–10.5)
POTASSIUM SERPL-SCNC: 3.3 MMOL/L (ref 3.5–5.1)
RBC # BLD: 3.78 M/UL (ref 4–5.2)
SODIUM BLD-SCNC: 141 MMOL/L (ref 136–145)
URINE CULTURE, ROUTINE: ABNORMAL
WBC # BLD: 10.3 K/UL (ref 4–11)

## 2019-10-31 PROCEDURE — 6370000000 HC RX 637 (ALT 250 FOR IP): Performed by: INTERNAL MEDICINE

## 2019-10-31 PROCEDURE — 6360000002 HC RX W HCPCS: Performed by: INTERNAL MEDICINE

## 2019-10-31 PROCEDURE — 2580000003 HC RX 258: Performed by: INTERNAL MEDICINE

## 2019-10-31 PROCEDURE — 94669 MECHANICAL CHEST WALL OSCILL: CPT

## 2019-10-31 PROCEDURE — 36415 COLL VENOUS BLD VENIPUNCTURE: CPT

## 2019-10-31 PROCEDURE — 85025 COMPLETE CBC W/AUTO DIFF WBC: CPT

## 2019-10-31 PROCEDURE — 71045 X-RAY EXAM CHEST 1 VIEW: CPT

## 2019-10-31 PROCEDURE — 1200000000 HC SEMI PRIVATE

## 2019-10-31 PROCEDURE — 99232 SBSQ HOSP IP/OBS MODERATE 35: CPT | Performed by: INTERNAL MEDICINE

## 2019-10-31 PROCEDURE — 83735 ASSAY OF MAGNESIUM: CPT

## 2019-10-31 PROCEDURE — 84100 ASSAY OF PHOSPHORUS: CPT

## 2019-10-31 PROCEDURE — 94640 AIRWAY INHALATION TREATMENT: CPT

## 2019-10-31 PROCEDURE — 94761 N-INVAS EAR/PLS OXIMETRY MLT: CPT

## 2019-10-31 PROCEDURE — 2700000000 HC OXYGEN THERAPY PER DAY

## 2019-10-31 PROCEDURE — 80048 BASIC METABOLIC PNL TOTAL CA: CPT

## 2019-10-31 PROCEDURE — 99233 SBSQ HOSP IP/OBS HIGH 50: CPT | Performed by: INTERNAL MEDICINE

## 2019-10-31 RX ADMIN — Medication 10 ML: at 22:12

## 2019-10-31 RX ADMIN — IPRATROPIUM BROMIDE AND ALBUTEROL SULFATE 3 ML: .5; 3 SOLUTION RESPIRATORY (INHALATION) at 11:32

## 2019-10-31 RX ADMIN — DIVALPROEX SODIUM 250 MG: 250 TABLET, EXTENDED RELEASE ORAL at 22:07

## 2019-10-31 RX ADMIN — ATORVASTATIN CALCIUM 10 MG: 10 TABLET, FILM COATED ORAL at 10:03

## 2019-10-31 RX ADMIN — IPRATROPIUM BROMIDE AND ALBUTEROL SULFATE 3 ML: .5; 3 SOLUTION RESPIRATORY (INHALATION) at 07:25

## 2019-10-31 RX ADMIN — FERROUS SULFATE TAB 325 MG (65 MG ELEMENTAL FE) 325 MG: 325 (65 FE) TAB at 10:02

## 2019-10-31 RX ADMIN — CITALOPRAM HYDROBROMIDE 10 MG: 20 TABLET ORAL at 10:04

## 2019-10-31 RX ADMIN — ENOXAPARIN SODIUM 30 MG: 30 INJECTION SUBCUTANEOUS at 10:02

## 2019-10-31 RX ADMIN — CETIRIZINE HYDROCHLORIDE 5 MG: 10 TABLET ORAL at 10:03

## 2019-10-31 RX ADMIN — FERROUS SULFATE TAB 325 MG (65 MG ELEMENTAL FE) 325 MG: 325 (65 FE) TAB at 16:29

## 2019-10-31 RX ADMIN — Medication 10 ML: at 10:02

## 2019-10-31 RX ADMIN — IPRATROPIUM BROMIDE AND ALBUTEROL SULFATE 3 ML: .5; 3 SOLUTION RESPIRATORY (INHALATION) at 19:15

## 2019-10-31 RX ADMIN — GUAIFENESIN 600 MG: 600 TABLET, EXTENDED RELEASE ORAL at 22:06

## 2019-10-31 RX ADMIN — LEVOTHYROXINE SODIUM 125 MCG: 125 TABLET ORAL at 10:02

## 2019-10-31 RX ADMIN — ALBUTEROL SULFATE 2.5 MG: 2.5 SOLUTION RESPIRATORY (INHALATION) at 09:35

## 2019-10-31 RX ADMIN — MIRTAZAPINE 15 MG: 15 TABLET, FILM COATED ORAL at 22:07

## 2019-10-31 RX ADMIN — PANTOPRAZOLE SODIUM 40 MG: 40 TABLET, DELAYED RELEASE ORAL at 06:33

## 2019-10-31 RX ADMIN — IPRATROPIUM BROMIDE AND ALBUTEROL SULFATE 3 ML: .5; 3 SOLUTION RESPIRATORY (INHALATION) at 15:17

## 2019-10-31 RX ADMIN — PANTOPRAZOLE SODIUM 40 MG: 40 TABLET, DELAYED RELEASE ORAL at 16:29

## 2019-10-31 RX ADMIN — MEROPENEM 1 G: 1 INJECTION, POWDER, FOR SOLUTION INTRAVENOUS at 13:35

## 2019-10-31 RX ADMIN — GUAIFENESIN 600 MG: 600 TABLET, EXTENDED RELEASE ORAL at 10:03

## 2019-10-31 RX ADMIN — DOCUSATE SODIUM 100 MG: 100 CAPSULE, LIQUID FILLED ORAL at 10:02

## 2019-10-31 RX ADMIN — MUPIROCIN: 20 OINTMENT TOPICAL at 10:27

## 2019-10-31 RX ADMIN — IPRATROPIUM BROMIDE AND ALBUTEROL SULFATE 3 ML: .5; 3 SOLUTION RESPIRATORY (INHALATION) at 02:29

## 2019-10-31 RX ADMIN — LEVOFLOXACIN 500 MG: 500 TABLET, FILM COATED ORAL at 10:03

## 2019-10-31 RX ADMIN — DONEPEZIL HYDROCHLORIDE 10 MG: 5 TABLET, FILM COATED ORAL at 10:03

## 2019-10-31 RX ADMIN — MUPIROCIN: 20 OINTMENT TOPICAL at 22:12

## 2019-10-31 RX ADMIN — IPRATROPIUM BROMIDE AND ALBUTEROL SULFATE 3 ML: .5; 3 SOLUTION RESPIRATORY (INHALATION) at 23:04

## 2019-11-01 LAB
ANION GAP SERPL CALCULATED.3IONS-SCNC: 9 MMOL/L (ref 3–16)
BASOPHILS ABSOLUTE: 0 K/UL (ref 0–0.2)
BASOPHILS RELATIVE PERCENT: 0.4 %
BUN BLDV-MCNC: 23 MG/DL (ref 7–20)
CALCIUM SERPL-MCNC: 8.3 MG/DL (ref 8.3–10.6)
CHLORIDE BLD-SCNC: 104 MMOL/L (ref 99–110)
CO2: 26 MMOL/L (ref 21–32)
CREAT SERPL-MCNC: 0.9 MG/DL (ref 0.6–1.2)
EOSINOPHILS ABSOLUTE: 0.3 K/UL (ref 0–0.6)
EOSINOPHILS RELATIVE PERCENT: 2.7 %
GFR AFRICAN AMERICAN: >60
GFR NON-AFRICAN AMERICAN: 58
GLUCOSE BLD-MCNC: 105 MG/DL (ref 70–99)
HCT VFR BLD CALC: 36.2 % (ref 36–48)
HEMOGLOBIN: 12 G/DL (ref 12–16)
LYMPHOCYTES ABSOLUTE: 0.9 K/UL (ref 1–5.1)
LYMPHOCYTES RELATIVE PERCENT: 8.8 %
MAGNESIUM: 2.1 MG/DL (ref 1.8–2.4)
MCH RBC QN AUTO: 32.7 PG (ref 26–34)
MCHC RBC AUTO-ENTMCNC: 33.2 G/DL (ref 31–36)
MCV RBC AUTO: 98.6 FL (ref 80–100)
MONOCYTES ABSOLUTE: 1.2 K/UL (ref 0–1.3)
MONOCYTES RELATIVE PERCENT: 11.9 %
NEUTROPHILS ABSOLUTE: 7.9 K/UL (ref 1.7–7.7)
NEUTROPHILS RELATIVE PERCENT: 76.2 %
PDW BLD-RTO: 13.2 % (ref 12.4–15.4)
PHOSPHORUS: 2.3 MG/DL (ref 2.5–4.9)
PLATELET # BLD: 234 K/UL (ref 135–450)
PMV BLD AUTO: 8.9 FL (ref 5–10.5)
POTASSIUM SERPL-SCNC: 3.1 MMOL/L (ref 3.5–5.1)
RBC # BLD: 3.67 M/UL (ref 4–5.2)
SODIUM BLD-SCNC: 139 MMOL/L (ref 136–145)
WBC # BLD: 10.4 K/UL (ref 4–11)

## 2019-11-01 PROCEDURE — 99232 SBSQ HOSP IP/OBS MODERATE 35: CPT | Performed by: INTERNAL MEDICINE

## 2019-11-01 PROCEDURE — 6370000000 HC RX 637 (ALT 250 FOR IP): Performed by: INTERNAL MEDICINE

## 2019-11-01 PROCEDURE — 80048 BASIC METABOLIC PNL TOTAL CA: CPT

## 2019-11-01 PROCEDURE — 1200000000 HC SEMI PRIVATE

## 2019-11-01 PROCEDURE — 6360000002 HC RX W HCPCS: Performed by: INTERNAL MEDICINE

## 2019-11-01 PROCEDURE — 2580000003 HC RX 258: Performed by: INTERNAL MEDICINE

## 2019-11-01 PROCEDURE — 83735 ASSAY OF MAGNESIUM: CPT

## 2019-11-01 PROCEDURE — 36415 COLL VENOUS BLD VENIPUNCTURE: CPT

## 2019-11-01 PROCEDURE — 92526 ORAL FUNCTION THERAPY: CPT

## 2019-11-01 PROCEDURE — 84100 ASSAY OF PHOSPHORUS: CPT

## 2019-11-01 PROCEDURE — 99233 SBSQ HOSP IP/OBS HIGH 50: CPT | Performed by: INTERNAL MEDICINE

## 2019-11-01 PROCEDURE — 94669 MECHANICAL CHEST WALL OSCILL: CPT

## 2019-11-01 PROCEDURE — 94761 N-INVAS EAR/PLS OXIMETRY MLT: CPT

## 2019-11-01 PROCEDURE — 85025 COMPLETE CBC W/AUTO DIFF WBC: CPT

## 2019-11-01 PROCEDURE — 94640 AIRWAY INHALATION TREATMENT: CPT

## 2019-11-01 PROCEDURE — 2700000000 HC OXYGEN THERAPY PER DAY

## 2019-11-01 RX ORDER — POTASSIUM CHLORIDE 7.45 MG/ML
10 INJECTION INTRAVENOUS
Status: DISPENSED | OUTPATIENT
Start: 2019-11-01 | End: 2019-11-01

## 2019-11-01 RX ORDER — SODIUM CHLORIDE 9 MG/ML
INJECTION, SOLUTION INTRAVENOUS
Status: DISPENSED
Start: 2019-11-01 | End: 2019-11-01

## 2019-11-01 RX ORDER — POTASSIUM CHLORIDE 7.45 MG/ML
10 INJECTION INTRAVENOUS
Status: DISCONTINUED | OUTPATIENT
Start: 2019-11-01 | End: 2019-11-01 | Stop reason: SDUPTHER

## 2019-11-01 RX ORDER — POTASSIUM CHLORIDE 7.45 MG/ML
10 INJECTION INTRAVENOUS
Status: COMPLETED | OUTPATIENT
Start: 2019-11-01 | End: 2019-11-01

## 2019-11-01 RX ADMIN — POTASSIUM CHLORIDE 10 MEQ: 7.46 INJECTION, SOLUTION INTRAVENOUS at 14:38

## 2019-11-01 RX ADMIN — IPRATROPIUM BROMIDE AND ALBUTEROL SULFATE 3 ML: .5; 3 SOLUTION RESPIRATORY (INHALATION) at 15:45

## 2019-11-01 RX ADMIN — MEROPENEM 1 G: 1 INJECTION, POWDER, FOR SOLUTION INTRAVENOUS at 00:30

## 2019-11-01 RX ADMIN — PANTOPRAZOLE SODIUM 40 MG: 40 TABLET, DELAYED RELEASE ORAL at 06:22

## 2019-11-01 RX ADMIN — IPRATROPIUM BROMIDE AND ALBUTEROL SULFATE 3 ML: .5; 3 SOLUTION RESPIRATORY (INHALATION) at 02:48

## 2019-11-01 RX ADMIN — IPRATROPIUM BROMIDE AND ALBUTEROL SULFATE 3 ML: .5; 3 SOLUTION RESPIRATORY (INHALATION) at 20:22

## 2019-11-01 RX ADMIN — MEROPENEM 1 G: 1 INJECTION, POWDER, FOR SOLUTION INTRAVENOUS at 23:52

## 2019-11-01 RX ADMIN — IPRATROPIUM BROMIDE AND ALBUTEROL SULFATE 3 ML: .5; 3 SOLUTION RESPIRATORY (INHALATION) at 07:35

## 2019-11-01 RX ADMIN — MIRTAZAPINE 15 MG: 15 TABLET, FILM COATED ORAL at 20:43

## 2019-11-01 RX ADMIN — Medication 10 ML: at 20:43

## 2019-11-01 RX ADMIN — MUPIROCIN: 20 OINTMENT TOPICAL at 20:48

## 2019-11-01 RX ADMIN — POTASSIUM CHLORIDE 10 MEQ: 7.46 INJECTION, SOLUTION INTRAVENOUS at 11:49

## 2019-11-01 RX ADMIN — MEROPENEM 1 G: 1 INJECTION, POWDER, FOR SOLUTION INTRAVENOUS at 11:09

## 2019-11-01 RX ADMIN — DIVALPROEX SODIUM 250 MG: 250 TABLET, EXTENDED RELEASE ORAL at 20:43

## 2019-11-01 RX ADMIN — POTASSIUM CHLORIDE 10 MEQ: 7.46 INJECTION, SOLUTION INTRAVENOUS at 18:31

## 2019-11-01 RX ADMIN — IPRATROPIUM BROMIDE AND ALBUTEROL SULFATE 3 ML: .5; 3 SOLUTION RESPIRATORY (INHALATION) at 11:15

## 2019-11-01 RX ADMIN — POTASSIUM CHLORIDE 10 MEQ: 7.46 INJECTION, SOLUTION INTRAVENOUS at 16:27

## 2019-11-01 ASSESSMENT — PAIN SCALES - GENERAL: PAINLEVEL_OUTOF10: 0

## 2019-11-02 LAB
BLOOD CULTURE, ROUTINE: NORMAL
CULTURE, BLOOD 2: NORMAL
GLUCOSE BLD-MCNC: 76 MG/DL (ref 70–99)
GLUCOSE BLD-MCNC: 86 MG/DL (ref 70–99)
PERFORMED ON: NORMAL
PERFORMED ON: NORMAL

## 2019-11-02 PROCEDURE — 6360000002 HC RX W HCPCS: Performed by: INTERNAL MEDICINE

## 2019-11-02 PROCEDURE — 94761 N-INVAS EAR/PLS OXIMETRY MLT: CPT

## 2019-11-02 PROCEDURE — 2580000003 HC RX 258: Performed by: INTERNAL MEDICINE

## 2019-11-02 PROCEDURE — 1200000000 HC SEMI PRIVATE

## 2019-11-02 PROCEDURE — 92526 ORAL FUNCTION THERAPY: CPT

## 2019-11-02 PROCEDURE — 6370000000 HC RX 637 (ALT 250 FOR IP): Performed by: INTERNAL MEDICINE

## 2019-11-02 PROCEDURE — 99233 SBSQ HOSP IP/OBS HIGH 50: CPT | Performed by: INTERNAL MEDICINE

## 2019-11-02 PROCEDURE — 2700000000 HC OXYGEN THERAPY PER DAY

## 2019-11-02 PROCEDURE — 94640 AIRWAY INHALATION TREATMENT: CPT

## 2019-11-02 RX ORDER — DEXTROSE MONOHYDRATE 25 G/50ML
12.5 INJECTION, SOLUTION INTRAVENOUS PRN
Status: DISCONTINUED | OUTPATIENT
Start: 2019-11-02 | End: 2019-11-04 | Stop reason: HOSPADM

## 2019-11-02 RX ORDER — IPRATROPIUM BROMIDE AND ALBUTEROL SULFATE 2.5; .5 MG/3ML; MG/3ML
1 SOLUTION RESPIRATORY (INHALATION)
Status: DISCONTINUED | OUTPATIENT
Start: 2019-11-02 | End: 2019-11-04

## 2019-11-02 RX ORDER — NICOTINE POLACRILEX 4 MG
15 LOZENGE BUCCAL PRN
Status: DISCONTINUED | OUTPATIENT
Start: 2019-11-02 | End: 2019-11-04 | Stop reason: HOSPADM

## 2019-11-02 RX ORDER — DEXTROSE MONOHYDRATE 50 MG/ML
100 INJECTION, SOLUTION INTRAVENOUS PRN
Status: DISCONTINUED | OUTPATIENT
Start: 2019-11-02 | End: 2019-11-04 | Stop reason: HOSPADM

## 2019-11-02 RX ADMIN — IPRATROPIUM BROMIDE AND ALBUTEROL SULFATE 3 ML: .5; 3 SOLUTION RESPIRATORY (INHALATION) at 15:41

## 2019-11-02 RX ADMIN — Medication 10 ML: at 21:09

## 2019-11-02 RX ADMIN — HYDRALAZINE HYDROCHLORIDE 10 MG: 20 INJECTION INTRAMUSCULAR; INTRAVENOUS at 21:04

## 2019-11-02 RX ADMIN — Medication 10 ML: at 12:54

## 2019-11-02 RX ADMIN — IPRATROPIUM BROMIDE AND ALBUTEROL SULFATE 3 ML: .5; 3 SOLUTION RESPIRATORY (INHALATION) at 00:00

## 2019-11-02 RX ADMIN — IPRATROPIUM BROMIDE AND ALBUTEROL SULFATE 3 ML: .5; 3 SOLUTION RESPIRATORY (INHALATION) at 11:53

## 2019-11-02 RX ADMIN — MEROPENEM 1 G: 1 INJECTION, POWDER, FOR SOLUTION INTRAVENOUS at 12:52

## 2019-11-02 RX ADMIN — IPRATROPIUM BROMIDE AND ALBUTEROL SULFATE 3 ML: .5; 3 SOLUTION RESPIRATORY (INHALATION) at 07:27

## 2019-11-03 LAB
ANION GAP SERPL CALCULATED.3IONS-SCNC: 13 MMOL/L (ref 3–16)
BASOPHILS ABSOLUTE: 0 K/UL (ref 0–0.2)
BASOPHILS RELATIVE PERCENT: 0 %
BUN BLDV-MCNC: 23 MG/DL (ref 7–20)
CALCIUM SERPL-MCNC: 8.7 MG/DL (ref 8.3–10.6)
CHLORIDE BLD-SCNC: 107 MMOL/L (ref 99–110)
CO2: 24 MMOL/L (ref 21–32)
CREAT SERPL-MCNC: 0.7 MG/DL (ref 0.6–1.2)
EOSINOPHILS ABSOLUTE: 0.3 K/UL (ref 0–0.6)
EOSINOPHILS RELATIVE PERCENT: 3 %
GFR AFRICAN AMERICAN: >60
GFR NON-AFRICAN AMERICAN: >60
GLUCOSE BLD-MCNC: 75 MG/DL (ref 70–99)
GLUCOSE BLD-MCNC: 81 MG/DL (ref 70–99)
GLUCOSE BLD-MCNC: 83 MG/DL (ref 70–99)
GLUCOSE BLD-MCNC: 87 MG/DL (ref 70–99)
GLUCOSE BLD-MCNC: 93 MG/DL (ref 70–99)
GLUCOSE BLD-MCNC: 96 MG/DL (ref 70–99)
HCT VFR BLD CALC: 37.5 % (ref 36–48)
HEMATOLOGY PATH CONSULT: NO
HEMOGLOBIN: 12.5 G/DL (ref 12–16)
LYMPHOCYTES ABSOLUTE: 2 K/UL (ref 1–5.1)
LYMPHOCYTES RELATIVE PERCENT: 22 %
MCH RBC QN AUTO: 32.4 PG (ref 26–34)
MCHC RBC AUTO-ENTMCNC: 33.3 G/DL (ref 31–36)
MCV RBC AUTO: 97.5 FL (ref 80–100)
METAMYELOCYTES RELATIVE PERCENT: 1 %
MONOCYTES ABSOLUTE: 0.9 K/UL (ref 0–1.3)
MONOCYTES RELATIVE PERCENT: 10 %
MYELOCYTE PERCENT: 1 %
NEUTROPHILS ABSOLUTE: 5.9 K/UL (ref 1.7–7.7)
NEUTROPHILS RELATIVE PERCENT: 63 %
PDW BLD-RTO: 13.2 % (ref 12.4–15.4)
PERFORMED ON: NORMAL
PLATELET # BLD: 299 K/UL (ref 135–450)
PLATELET SLIDE REVIEW: ADEQUATE
PMV BLD AUTO: 8.9 FL (ref 5–10.5)
POLYCHROMASIA: ABNORMAL
POTASSIUM SERPL-SCNC: 3.6 MMOL/L (ref 3.5–5.1)
RBC # BLD: 3.84 M/UL (ref 4–5.2)
SLIDE REVIEW: ABNORMAL
SODIUM BLD-SCNC: 144 MMOL/L (ref 136–145)
WBC # BLD: 9.1 K/UL (ref 4–11)

## 2019-11-03 PROCEDURE — 2580000003 HC RX 258: Performed by: INTERNAL MEDICINE

## 2019-11-03 PROCEDURE — 1200000000 HC SEMI PRIVATE

## 2019-11-03 PROCEDURE — 80048 BASIC METABOLIC PNL TOTAL CA: CPT

## 2019-11-03 PROCEDURE — 6360000002 HC RX W HCPCS: Performed by: INTERNAL MEDICINE

## 2019-11-03 PROCEDURE — 6370000000 HC RX 637 (ALT 250 FOR IP): Performed by: INTERNAL MEDICINE

## 2019-11-03 PROCEDURE — 85025 COMPLETE CBC W/AUTO DIFF WBC: CPT

## 2019-11-03 PROCEDURE — 94640 AIRWAY INHALATION TREATMENT: CPT

## 2019-11-03 PROCEDURE — 2700000000 HC OXYGEN THERAPY PER DAY

## 2019-11-03 PROCEDURE — 36415 COLL VENOUS BLD VENIPUNCTURE: CPT

## 2019-11-03 PROCEDURE — 94761 N-INVAS EAR/PLS OXIMETRY MLT: CPT

## 2019-11-03 PROCEDURE — 99233 SBSQ HOSP IP/OBS HIGH 50: CPT | Performed by: INTERNAL MEDICINE

## 2019-11-03 PROCEDURE — 94669 MECHANICAL CHEST WALL OSCILL: CPT

## 2019-11-03 RX ORDER — QUETIAPINE FUMARATE 25 MG/1
25 TABLET, FILM COATED ORAL NIGHTLY
Status: DISCONTINUED | OUTPATIENT
Start: 2019-11-03 | End: 2019-11-04 | Stop reason: HOSPADM

## 2019-11-03 RX ADMIN — MEROPENEM 1 G: 1 INJECTION, POWDER, FOR SOLUTION INTRAVENOUS at 00:38

## 2019-11-03 RX ADMIN — IPRATROPIUM BROMIDE AND ALBUTEROL SULFATE 3 ML: .5; 3 SOLUTION RESPIRATORY (INHALATION) at 12:12

## 2019-11-03 RX ADMIN — MEROPENEM 1 G: 1 INJECTION, POWDER, FOR SOLUTION INTRAVENOUS at 23:41

## 2019-11-03 RX ADMIN — IPRATROPIUM BROMIDE AND ALBUTEROL SULFATE 3 ML: .5; 3 SOLUTION RESPIRATORY (INHALATION) at 00:08

## 2019-11-03 RX ADMIN — IPRATROPIUM BROMIDE AND ALBUTEROL SULFATE 3 ML: .5; 3 SOLUTION RESPIRATORY (INHALATION) at 15:57

## 2019-11-03 RX ADMIN — IPRATROPIUM BROMIDE AND ALBUTEROL SULFATE 3 ML: .5; 3 SOLUTION RESPIRATORY (INHALATION) at 23:57

## 2019-11-03 RX ADMIN — IPRATROPIUM BROMIDE AND ALBUTEROL SULFATE 3 ML: .5; 3 SOLUTION RESPIRATORY (INHALATION) at 08:54

## 2019-11-03 RX ADMIN — MEROPENEM 1 G: 1 INJECTION, POWDER, FOR SOLUTION INTRAVENOUS at 11:26

## 2019-11-03 RX ADMIN — QUETIAPINE FUMARATE 25 MG: 25 TABLET ORAL at 17:27

## 2019-11-03 RX ADMIN — IPRATROPIUM BROMIDE AND ALBUTEROL SULFATE 3 ML: .5; 3 SOLUTION RESPIRATORY (INHALATION) at 20:33

## 2019-11-03 RX ADMIN — Medication 10 ML: at 21:42

## 2019-11-03 ASSESSMENT — PAIN SCALES - GENERAL: PAINLEVEL_OUTOF10: 0

## 2019-11-04 VITALS
WEIGHT: 154.6 LBS | OXYGEN SATURATION: 95 % | HEIGHT: 65 IN | BODY MASS INDEX: 25.76 KG/M2 | TEMPERATURE: 97.4 F | SYSTOLIC BLOOD PRESSURE: 154 MMHG | RESPIRATION RATE: 18 BRPM | HEART RATE: 87 BPM | DIASTOLIC BLOOD PRESSURE: 73 MMHG

## 2019-11-04 LAB
ANION GAP SERPL CALCULATED.3IONS-SCNC: 12 MMOL/L (ref 3–16)
BANDED NEUTROPHILS RELATIVE PERCENT: 1 % (ref 0–7)
BASOPHILS ABSOLUTE: 0 K/UL (ref 0–0.2)
BASOPHILS RELATIVE PERCENT: 0 %
BUN BLDV-MCNC: 25 MG/DL (ref 7–20)
CALCIUM SERPL-MCNC: 8.7 MG/DL (ref 8.3–10.6)
CHLORIDE BLD-SCNC: 106 MMOL/L (ref 99–110)
CO2: 25 MMOL/L (ref 21–32)
CREAT SERPL-MCNC: 0.7 MG/DL (ref 0.6–1.2)
EOSINOPHILS ABSOLUTE: 0 K/UL (ref 0–0.6)
EOSINOPHILS RELATIVE PERCENT: 0 %
GFR AFRICAN AMERICAN: >60
GFR NON-AFRICAN AMERICAN: >60
GLUCOSE BLD-MCNC: 78 MG/DL (ref 70–99)
GLUCOSE BLD-MCNC: 80 MG/DL (ref 70–99)
GLUCOSE BLD-MCNC: 81 MG/DL (ref 70–99)
GLUCOSE BLD-MCNC: 83 MG/DL (ref 70–99)
GLUCOSE BLD-MCNC: 91 MG/DL (ref 70–99)
HCT VFR BLD CALC: 36.3 % (ref 36–48)
HEMATOLOGY PATH CONSULT: NO
HEMOGLOBIN: 12.1 G/DL (ref 12–16)
LYMPHOCYTES ABSOLUTE: 1.7 K/UL (ref 1–5.1)
LYMPHOCYTES RELATIVE PERCENT: 22 %
MCH RBC QN AUTO: 32.6 PG (ref 26–34)
MCHC RBC AUTO-ENTMCNC: 33.3 G/DL (ref 31–36)
MCV RBC AUTO: 97.9 FL (ref 80–100)
MONOCYTES ABSOLUTE: 0.4 K/UL (ref 0–1.3)
MONOCYTES RELATIVE PERCENT: 5 %
NEUTROPHILS ABSOLUTE: 5.5 K/UL (ref 1.7–7.7)
NEUTROPHILS RELATIVE PERCENT: 72 %
PDW BLD-RTO: 13.2 % (ref 12.4–15.4)
PERFORMED ON: NORMAL
PLATELET # BLD: 304 K/UL (ref 135–450)
PLATELET SLIDE REVIEW: ADEQUATE
PMV BLD AUTO: 8.7 FL (ref 5–10.5)
POLYCHROMASIA: ABNORMAL
POTASSIUM SERPL-SCNC: 3.4 MMOL/L (ref 3.5–5.1)
RBC # BLD: 3.71 M/UL (ref 4–5.2)
SLIDE REVIEW: ABNORMAL
SODIUM BLD-SCNC: 143 MMOL/L (ref 136–145)
WBC # BLD: 7.6 K/UL (ref 4–11)

## 2019-11-04 PROCEDURE — 85025 COMPLETE CBC W/AUTO DIFF WBC: CPT

## 2019-11-04 PROCEDURE — 6360000002 HC RX W HCPCS: Performed by: INTERNAL MEDICINE

## 2019-11-04 PROCEDURE — 80048 BASIC METABOLIC PNL TOTAL CA: CPT

## 2019-11-04 PROCEDURE — 94761 N-INVAS EAR/PLS OXIMETRY MLT: CPT

## 2019-11-04 PROCEDURE — 99232 SBSQ HOSP IP/OBS MODERATE 35: CPT | Performed by: INTERNAL MEDICINE

## 2019-11-04 PROCEDURE — 99233 SBSQ HOSP IP/OBS HIGH 50: CPT | Performed by: INTERNAL MEDICINE

## 2019-11-04 PROCEDURE — 2700000000 HC OXYGEN THERAPY PER DAY

## 2019-11-04 PROCEDURE — 6370000000 HC RX 637 (ALT 250 FOR IP): Performed by: INTERNAL MEDICINE

## 2019-11-04 PROCEDURE — 36415 COLL VENOUS BLD VENIPUNCTURE: CPT

## 2019-11-04 PROCEDURE — 94640 AIRWAY INHALATION TREATMENT: CPT

## 2019-11-04 PROCEDURE — 92526 ORAL FUNCTION THERAPY: CPT

## 2019-11-04 PROCEDURE — 2580000003 HC RX 258

## 2019-11-04 PROCEDURE — 2580000003 HC RX 258: Performed by: INTERNAL MEDICINE

## 2019-11-04 RX ORDER — SODIUM CHLORIDE 9 MG/ML
INJECTION, SOLUTION INTRAVENOUS
Status: COMPLETED
Start: 2019-11-04 | End: 2019-11-04

## 2019-11-04 RX ORDER — ALBUTEROL SULFATE 2.5 MG/3ML
2.5 SOLUTION RESPIRATORY (INHALATION) 4 TIMES DAILY
Status: DISCONTINUED | OUTPATIENT
Start: 2019-11-04 | End: 2019-11-04 | Stop reason: HOSPADM

## 2019-11-04 RX ORDER — LORAZEPAM 2 MG/ML
1 CONCENTRATE ORAL EVERY 4 HOURS PRN
Qty: 30 ML | Refills: 0 | Status: SHIPPED | OUTPATIENT
Start: 2019-11-04 | End: 2019-11-07

## 2019-11-04 RX ORDER — ALBUTEROL SULFATE 2.5 MG/3ML
2.5 SOLUTION RESPIRATORY (INHALATION) EVERY 4 HOURS PRN
Status: DISCONTINUED | OUTPATIENT
Start: 2019-11-04 | End: 2019-11-04 | Stop reason: HOSPADM

## 2019-11-04 RX ADMIN — ENOXAPARIN SODIUM 30 MG: 30 INJECTION SUBCUTANEOUS at 09:31

## 2019-11-04 RX ADMIN — ALBUTEROL SULFATE 2.5 MG: 2.5 SOLUTION RESPIRATORY (INHALATION) at 12:00

## 2019-11-04 RX ADMIN — ALBUTEROL SULFATE 2.5 MG: 2.5 SOLUTION RESPIRATORY (INHALATION) at 09:10

## 2019-11-04 RX ADMIN — BISACODYL 10 MG: 10 SUPPOSITORY RECTAL at 09:31

## 2019-11-04 RX ADMIN — MEROPENEM 1 G: 1 INJECTION, POWDER, FOR SOLUTION INTRAVENOUS at 12:21

## 2019-11-04 RX ADMIN — IPRATROPIUM BROMIDE AND ALBUTEROL SULFATE 3 ML: .5; 3 SOLUTION RESPIRATORY (INHALATION) at 07:58

## 2019-11-04 RX ADMIN — SODIUM CHLORIDE 250 ML: 9 INJECTION, SOLUTION INTRAVENOUS at 12:20

## 2020-09-21 ENCOUNTER — APPOINTMENT (OUTPATIENT)
Dept: GENERAL RADIOLOGY | Age: 85
DRG: 871 | End: 2020-09-21
Payer: MEDICARE

## 2020-09-21 ENCOUNTER — HOSPITAL ENCOUNTER (INPATIENT)
Age: 85
LOS: 3 days | Discharge: NURSING, REHAB OR LONG-TERM CARE | DRG: 871 | End: 2020-09-25
Attending: EMERGENCY MEDICINE | Admitting: INTERNAL MEDICINE
Payer: MEDICARE

## 2020-09-21 LAB
A/G RATIO: 1.1 (ref 1.1–2.2)
ALBUMIN SERPL-MCNC: 3.8 G/DL (ref 3.4–5)
ALP BLD-CCNC: 93 U/L (ref 40–129)
ALT SERPL-CCNC: 17 U/L (ref 10–40)
ANION GAP SERPL CALCULATED.3IONS-SCNC: 12 MMOL/L (ref 3–16)
AST SERPL-CCNC: 38 U/L (ref 15–37)
BASOPHILS ABSOLUTE: 0.1 K/UL (ref 0–0.2)
BASOPHILS RELATIVE PERCENT: 0.6 %
BILIRUB SERPL-MCNC: 0.7 MG/DL (ref 0–1)
BUN BLDV-MCNC: 21 MG/DL (ref 7–20)
CALCIUM SERPL-MCNC: 8.9 MG/DL (ref 8.3–10.6)
CHLORIDE BLD-SCNC: 99 MMOL/L (ref 99–110)
CO2: 24 MMOL/L (ref 21–32)
CREAT SERPL-MCNC: 0.8 MG/DL (ref 0.6–1.2)
EOSINOPHILS ABSOLUTE: 0 K/UL (ref 0–0.6)
EOSINOPHILS RELATIVE PERCENT: 0 %
GFR AFRICAN AMERICAN: >60
GFR NON-AFRICAN AMERICAN: >60
GLOBULIN: 3.5 G/DL
GLUCOSE BLD-MCNC: 135 MG/DL (ref 70–99)
HCT VFR BLD CALC: 42 % (ref 36–48)
HEMOGLOBIN: 13.9 G/DL (ref 12–16)
LACTIC ACID, SEPSIS: 1.6 MMOL/L (ref 0.4–1.9)
LYMPHOCYTES ABSOLUTE: 1.6 K/UL (ref 1–5.1)
LYMPHOCYTES RELATIVE PERCENT: 14.5 %
MCH RBC QN AUTO: 32.2 PG (ref 26–34)
MCHC RBC AUTO-ENTMCNC: 33.1 G/DL (ref 31–36)
MCV RBC AUTO: 97.4 FL (ref 80–100)
MONOCYTES ABSOLUTE: 0.8 K/UL (ref 0–1.3)
MONOCYTES RELATIVE PERCENT: 6.9 %
NEUTROPHILS ABSOLUTE: 8.8 K/UL (ref 1.7–7.7)
NEUTROPHILS RELATIVE PERCENT: 78 %
PDW BLD-RTO: 12.6 % (ref 12.4–15.4)
PLATELET # BLD: 280 K/UL (ref 135–450)
PMV BLD AUTO: 9 FL (ref 5–10.5)
POTASSIUM REFLEX MAGNESIUM: 4.2 MMOL/L (ref 3.5–5.1)
RBC # BLD: 4.31 M/UL (ref 4–5.2)
SODIUM BLD-SCNC: 135 MMOL/L (ref 136–145)
TOTAL PROTEIN: 7.3 G/DL (ref 6.4–8.2)
WBC # BLD: 11.3 K/UL (ref 4–11)

## 2020-09-21 PROCEDURE — 85025 COMPLETE CBC W/AUTO DIFF WBC: CPT

## 2020-09-21 PROCEDURE — 80053 COMPREHEN METABOLIC PANEL: CPT

## 2020-09-21 PROCEDURE — 99291 CRITICAL CARE FIRST HOUR: CPT

## 2020-09-21 PROCEDURE — U0002 COVID-19 LAB TEST NON-CDC: HCPCS

## 2020-09-21 PROCEDURE — 87040 BLOOD CULTURE FOR BACTERIA: CPT

## 2020-09-21 PROCEDURE — 81001 URINALYSIS AUTO W/SCOPE: CPT

## 2020-09-21 PROCEDURE — 87186 SC STD MICRODIL/AGAR DIL: CPT

## 2020-09-21 PROCEDURE — 71045 X-RAY EXAM CHEST 1 VIEW: CPT

## 2020-09-21 PROCEDURE — 83605 ASSAY OF LACTIC ACID: CPT

## 2020-09-21 PROCEDURE — 2580000003 HC RX 258: Performed by: NURSE PRACTITIONER

## 2020-09-21 PROCEDURE — U0003 INFECTIOUS AGENT DETECTION BY NUCLEIC ACID (DNA OR RNA); SEVERE ACUTE RESPIRATORY SYNDROME CORONAVIRUS 2 (SARS-COV-2) (CORONAVIRUS DISEASE [COVID-19]), AMPLIFIED PROBE TECHNIQUE, MAKING USE OF HIGH THROUGHPUT TECHNOLOGIES AS DESCRIBED BY CMS-2020-01-R: HCPCS

## 2020-09-21 PROCEDURE — 93005 ELECTROCARDIOGRAM TRACING: CPT | Performed by: EMERGENCY MEDICINE

## 2020-09-21 PROCEDURE — 6360000002 HC RX W HCPCS: Performed by: NURSE PRACTITIONER

## 2020-09-21 PROCEDURE — 6370000000 HC RX 637 (ALT 250 FOR IP): Performed by: NURSE PRACTITIONER

## 2020-09-21 PROCEDURE — 87184 SC STD DISK METHOD PER PLATE: CPT

## 2020-09-21 PROCEDURE — 96367 TX/PROPH/DG ADDL SEQ IV INF: CPT

## 2020-09-21 PROCEDURE — 87086 URINE CULTURE/COLONY COUNT: CPT

## 2020-09-21 PROCEDURE — 87088 URINE BACTERIA CULTURE: CPT

## 2020-09-21 PROCEDURE — 96365 THER/PROPH/DIAG IV INF INIT: CPT

## 2020-09-21 RX ORDER — ACETAMINOPHEN 650 MG/1
650 SUPPOSITORY RECTAL ONCE
Status: COMPLETED | OUTPATIENT
Start: 2020-09-21 | End: 2020-09-21

## 2020-09-21 RX ORDER — 0.9 % SODIUM CHLORIDE 0.9 %
30 INTRAVENOUS SOLUTION INTRAVENOUS ONCE
Status: COMPLETED | OUTPATIENT
Start: 2020-09-21 | End: 2020-09-22

## 2020-09-21 RX ADMIN — ACETAMINOPHEN 650 MG: 650 SUPPOSITORY RECTAL at 22:50

## 2020-09-21 RX ADMIN — SODIUM CHLORIDE 1710 ML: 9 INJECTION, SOLUTION INTRAVENOUS at 22:43

## 2020-09-21 RX ADMIN — PIPERACILLIN SODIUM, TAZOBACTAM SODIUM 4.5 G: 4; .5 INJECTION, POWDER, LYOPHILIZED, FOR SOLUTION INTRAVENOUS at 22:51

## 2020-09-21 RX ADMIN — VANCOMYCIN HYDROCHLORIDE 1000 MG: 1 INJECTION, POWDER, LYOPHILIZED, FOR SOLUTION INTRAVENOUS at 23:24

## 2020-09-22 ENCOUNTER — APPOINTMENT (OUTPATIENT)
Dept: CT IMAGING | Age: 85
DRG: 871 | End: 2020-09-22
Payer: MEDICARE

## 2020-09-22 ENCOUNTER — APPOINTMENT (OUTPATIENT)
Dept: MRI IMAGING | Age: 85
DRG: 871 | End: 2020-09-22
Payer: MEDICARE

## 2020-09-22 PROBLEM — N39.0 UTI (URINARY TRACT INFECTION): Status: ACTIVE | Noted: 2020-09-22

## 2020-09-22 LAB
A/G RATIO: 1.3 (ref 1.1–2.2)
ALBUMIN SERPL-MCNC: 3.5 G/DL (ref 3.4–5)
ALP BLD-CCNC: 75 U/L (ref 40–129)
ALT SERPL-CCNC: 13 U/L (ref 10–40)
AMMONIA: 19 UMOL/L (ref 11–51)
ANION GAP SERPL CALCULATED.3IONS-SCNC: 9 MMOL/L (ref 3–16)
AST SERPL-CCNC: 23 U/L (ref 15–37)
BACTERIA: ABNORMAL /HPF
BASE EXCESS VENOUS: -0.2 MMOL/L (ref -3–3)
BASE EXCESS VENOUS: -4.3 MMOL/L (ref -3–3)
BASOPHILS ABSOLUTE: 0 K/UL (ref 0–0.2)
BASOPHILS RELATIVE PERCENT: 0.3 %
BILIRUB SERPL-MCNC: 1 MG/DL (ref 0–1)
BILIRUBIN URINE: NEGATIVE
BLOOD, URINE: ABNORMAL
BUN BLDV-MCNC: 16 MG/DL (ref 7–20)
CALCIUM SERPL-MCNC: 8.2 MG/DL (ref 8.3–10.6)
CARBOXYHEMOGLOBIN: 2 % (ref 0–1.5)
CARBOXYHEMOGLOBIN: 9.2 % (ref 0–1.5)
CHLORIDE BLD-SCNC: 105 MMOL/L (ref 99–110)
CLARITY: ABNORMAL
CO2: 24 MMOL/L (ref 21–32)
COLOR: YELLOW
CREAT SERPL-MCNC: 0.9 MG/DL (ref 0.6–1.2)
EKG ATRIAL RATE: 100 BPM
EKG DIAGNOSIS: NORMAL
EKG P AXIS: 94 DEGREES
EKG P-R INTERVAL: 178 MS
EKG Q-T INTERVAL: 374 MS
EKG QRS DURATION: 80 MS
EKG QTC CALCULATION (BAZETT): 482 MS
EKG R AXIS: -23 DEGREES
EKG T AXIS: 13 DEGREES
EKG VENTRICULAR RATE: 100 BPM
EOSINOPHILS ABSOLUTE: 0 K/UL (ref 0–0.6)
EOSINOPHILS RELATIVE PERCENT: 0.5 %
EPITHELIAL CELLS, UA: ABNORMAL /HPF (ref 0–5)
GFR AFRICAN AMERICAN: >60
GFR NON-AFRICAN AMERICAN: 58
GLOBULIN: 2.7 G/DL
GLUCOSE BLD-MCNC: 97 MG/DL (ref 70–99)
GLUCOSE URINE: NEGATIVE MG/DL
HCO3 VENOUS: 16.6 MMOL/L (ref 23–29)
HCO3 VENOUS: 25.4 MMOL/L (ref 23–29)
HCT VFR BLD CALC: 37 % (ref 36–48)
HEMOGLOBIN: 12.2 G/DL (ref 12–16)
KETONES, URINE: NEGATIVE MG/DL
LEUKOCYTE ESTERASE, URINE: ABNORMAL
LYMPHOCYTES ABSOLUTE: 1.4 K/UL (ref 1–5.1)
LYMPHOCYTES RELATIVE PERCENT: 17 %
MCH RBC QN AUTO: 32.4 PG (ref 26–34)
MCHC RBC AUTO-ENTMCNC: 33.1 G/DL (ref 31–36)
MCV RBC AUTO: 97.8 FL (ref 80–100)
METHEMOGLOBIN VENOUS: 0.4 %
METHEMOGLOBIN VENOUS: 3.1 %
MICROSCOPIC EXAMINATION: YES
MONOCYTES ABSOLUTE: 1 K/UL (ref 0–1.3)
MONOCYTES RELATIVE PERCENT: 11.9 %
NEUTROPHILS ABSOLUTE: 5.7 K/UL (ref 1.7–7.7)
NEUTROPHILS RELATIVE PERCENT: 70.3 %
NITRITE, URINE: POSITIVE
O2 CONTENT, VEN: 12 VOL %
O2 CONTENT, VEN: 5 VOL %
O2 SAT, VEN: 66 %
O2 SAT, VEN: 96 %
O2 THERAPY: ABNORMAL
O2 THERAPY: ABNORMAL
PCO2, VEN: 14.6 MMHG (ref 40–50)
PCO2, VEN: 44.8 MMHG (ref 40–50)
PDW BLD-RTO: 12.4 % (ref 12.4–15.4)
PH UA: 7 (ref 5–8)
PH VENOUS: 7.37 (ref 7.35–7.45)
PH VENOUS: 7.67 (ref 7.35–7.45)
PLATELET # BLD: 246 K/UL (ref 135–450)
PMV BLD AUTO: 9.1 FL (ref 5–10.5)
PO2, VEN: 116.2 MMHG (ref 25–40)
PO2, VEN: 34 MMHG (ref 25–40)
POTASSIUM REFLEX MAGNESIUM: 3.8 MMOL/L (ref 3.5–5.1)
PROTEIN UA: ABNORMAL MG/DL
RBC # BLD: 3.78 M/UL (ref 4–5.2)
RBC UA: ABNORMAL /HPF (ref 0–4)
SARS-COV-2, NAAT: NOT DETECTED
SARS-COV-2, PCR: NORMAL
SODIUM BLD-SCNC: 138 MMOL/L (ref 136–145)
SPECIFIC GRAVITY UA: 1.01 (ref 1–1.03)
TCO2 CALC VENOUS: 17 MMOL/L
TCO2 CALC VENOUS: 27 MMOL/L
TOTAL PROTEIN: 6.2 G/DL (ref 6.4–8.2)
TSH REFLEX: 3.29 UIU/ML (ref 0.27–4.2)
URINE REFLEX TO CULTURE: YES
URINE TYPE: ABNORMAL
UROBILINOGEN, URINE: 0.2 E.U./DL
VITAMIN B-12: 422 PG/ML (ref 211–911)
WBC # BLD: 8 K/UL (ref 4–11)
WBC UA: >100 /HPF (ref 0–5)

## 2020-09-22 PROCEDURE — 70551 MRI BRAIN STEM W/O DYE: CPT

## 2020-09-22 PROCEDURE — 92526 ORAL FUNCTION THERAPY: CPT

## 2020-09-22 PROCEDURE — 2580000003 HC RX 258: Performed by: NURSE PRACTITIONER

## 2020-09-22 PROCEDURE — 6360000002 HC RX W HCPCS: Performed by: NURSE PRACTITIONER

## 2020-09-22 PROCEDURE — 6370000000 HC RX 637 (ALT 250 FOR IP): Performed by: INTERNAL MEDICINE

## 2020-09-22 PROCEDURE — 1200000000 HC SEMI PRIVATE

## 2020-09-22 PROCEDURE — 92610 EVALUATE SWALLOWING FUNCTION: CPT

## 2020-09-22 PROCEDURE — 80053 COMPREHEN METABOLIC PANEL: CPT

## 2020-09-22 PROCEDURE — 85025 COMPLETE CBC W/AUTO DIFF WBC: CPT

## 2020-09-22 PROCEDURE — 97162 PT EVAL MOD COMPLEX 30 MIN: CPT

## 2020-09-22 PROCEDURE — 2580000003 HC RX 258: Performed by: INTERNAL MEDICINE

## 2020-09-22 PROCEDURE — 82607 VITAMIN B-12: CPT

## 2020-09-22 PROCEDURE — 82140 ASSAY OF AMMONIA: CPT

## 2020-09-22 PROCEDURE — 97530 THERAPEUTIC ACTIVITIES: CPT

## 2020-09-22 PROCEDURE — 93010 ELECTROCARDIOGRAM REPORT: CPT | Performed by: INTERNAL MEDICINE

## 2020-09-22 PROCEDURE — 96375 TX/PRO/DX INJ NEW DRUG ADDON: CPT

## 2020-09-22 PROCEDURE — 6360000002 HC RX W HCPCS: Performed by: INTERNAL MEDICINE

## 2020-09-22 PROCEDURE — 97166 OT EVAL MOD COMPLEX 45 MIN: CPT

## 2020-09-22 PROCEDURE — 70450 CT HEAD/BRAIN W/O DYE: CPT

## 2020-09-22 PROCEDURE — 82803 BLOOD GASES ANY COMBINATION: CPT

## 2020-09-22 PROCEDURE — 36415 COLL VENOUS BLD VENIPUNCTURE: CPT

## 2020-09-22 PROCEDURE — 99223 1ST HOSP IP/OBS HIGH 75: CPT | Performed by: PSYCHIATRY & NEUROLOGY

## 2020-09-22 PROCEDURE — 84443 ASSAY THYROID STIM HORMONE: CPT

## 2020-09-22 RX ORDER — SODIUM CHLORIDE 9 MG/ML
1000 INJECTION, SOLUTION INTRAVENOUS ONCE
Status: COMPLETED | OUTPATIENT
Start: 2020-09-22 | End: 2020-09-22

## 2020-09-22 RX ORDER — ATORVASTATIN CALCIUM 40 MG/1
40 TABLET, FILM COATED ORAL ONCE
Status: COMPLETED | OUTPATIENT
Start: 2020-09-22 | End: 2020-09-22

## 2020-09-22 RX ORDER — ACETAMINOPHEN 650 MG/1
650 SUPPOSITORY RECTAL EVERY 6 HOURS PRN
Status: DISCONTINUED | OUTPATIENT
Start: 2020-09-22 | End: 2020-09-25 | Stop reason: HOSPADM

## 2020-09-22 RX ORDER — ONDANSETRON 2 MG/ML
4 INJECTION INTRAMUSCULAR; INTRAVENOUS EVERY 6 HOURS PRN
Status: DISCONTINUED | OUTPATIENT
Start: 2020-09-22 | End: 2020-09-24 | Stop reason: SDUPTHER

## 2020-09-22 RX ORDER — FAMOTIDINE 20 MG/1
20 TABLET, FILM COATED ORAL DAILY
Status: DISCONTINUED | OUTPATIENT
Start: 2020-09-22 | End: 2020-09-25 | Stop reason: HOSPADM

## 2020-09-22 RX ORDER — SODIUM CHLORIDE 0.9 % (FLUSH) 0.9 %
10 SYRINGE (ML) INJECTION EVERY 12 HOURS SCHEDULED
Status: DISCONTINUED | OUTPATIENT
Start: 2020-09-22 | End: 2020-09-25 | Stop reason: HOSPADM

## 2020-09-22 RX ORDER — ASPIRIN 81 MG/1
81 TABLET ORAL DAILY
Status: DISCONTINUED | OUTPATIENT
Start: 2020-09-22 | End: 2020-09-22

## 2020-09-22 RX ORDER — GUAIFENESIN AND DEXTROMETHORPHAN HYDROBROMIDE 100; 10 MG/5ML; MG/5ML
10 SOLUTION ORAL EVERY 4 HOURS PRN
Status: ON HOLD | COMMUNITY
End: 2020-09-25 | Stop reason: HOSPADM

## 2020-09-22 RX ORDER — ACETAMINOPHEN 325 MG/1
650 TABLET ORAL EVERY 6 HOURS PRN
Status: DISCONTINUED | OUTPATIENT
Start: 2020-09-22 | End: 2020-09-25 | Stop reason: HOSPADM

## 2020-09-22 RX ORDER — PROMETHAZINE HYDROCHLORIDE 25 MG/1
12.5 TABLET ORAL EVERY 6 HOURS PRN
Status: DISCONTINUED | OUTPATIENT
Start: 2020-09-22 | End: 2020-09-24 | Stop reason: SDUPTHER

## 2020-09-22 RX ORDER — SODIUM CHLORIDE 0.9 % (FLUSH) 0.9 %
10 SYRINGE (ML) INJECTION PRN
Status: DISCONTINUED | OUTPATIENT
Start: 2020-09-22 | End: 2020-09-25 | Stop reason: HOSPADM

## 2020-09-22 RX ORDER — MIRTAZAPINE 7.5 MG/1
7.5 TABLET, FILM COATED ORAL NIGHTLY
Status: ON HOLD | COMMUNITY
End: 2020-09-25 | Stop reason: HOSPADM

## 2020-09-22 RX ORDER — LEVOTHYROXINE SODIUM 0.07 MG/1
75 TABLET ORAL DAILY
Status: ON HOLD | COMMUNITY
End: 2020-09-25 | Stop reason: HOSPADM

## 2020-09-22 RX ADMIN — ASPIRIN 325 MG: 325 TABLET, COATED ORAL at 02:32

## 2020-09-22 RX ADMIN — MEROPENEM 1 G: 1 INJECTION, POWDER, FOR SOLUTION INTRAVENOUS at 23:00

## 2020-09-22 RX ADMIN — VANCOMYCIN HYDROCHLORIDE 1000 MG: 1 INJECTION, POWDER, LYOPHILIZED, FOR SOLUTION INTRAVENOUS at 20:23

## 2020-09-22 RX ADMIN — MEROPENEM 1 G: 1 INJECTION, POWDER, FOR SOLUTION INTRAVENOUS at 12:15

## 2020-09-22 RX ADMIN — ASPIRIN 81 MG: 81 TABLET ORAL at 13:39

## 2020-09-22 RX ADMIN — FAMOTIDINE 20 MG: 20 TABLET, FILM COATED ORAL at 12:14

## 2020-09-22 RX ADMIN — SODIUM CHLORIDE 1000 ML: 9 INJECTION, SOLUTION INTRAVENOUS at 02:36

## 2020-09-22 RX ADMIN — ENOXAPARIN SODIUM 30 MG: 30 INJECTION SUBCUTANEOUS at 13:05

## 2020-09-22 RX ADMIN — ACETAMINOPHEN 650 MG: 325 TABLET ORAL at 20:35

## 2020-09-22 RX ADMIN — SODIUM CHLORIDE, PRESERVATIVE FREE 10 ML: 5 INJECTION INTRAVENOUS at 20:25

## 2020-09-22 RX ADMIN — MEROPENEM 1 G: 1 INJECTION, POWDER, FOR SOLUTION INTRAVENOUS at 00:36

## 2020-09-22 RX ADMIN — ATORVASTATIN CALCIUM 40 MG: 40 TABLET, FILM COATED ORAL at 02:33

## 2020-09-22 ASSESSMENT — PAIN SCALES - GENERAL
PAINLEVEL_OUTOF10: 0

## 2020-09-22 ASSESSMENT — PAIN SCALES - PAIN ASSESSMENT IN ADVANCED DEMENTIA (PAINAD)
NEGVOCALIZATION: 0
BREATHING: 0
FACIALEXPRESSION: 0
BODYLANGUAGE: 0
NEGVOCALIZATION: 0
CONSOLABILITY: 0
BREATHING: 0
TOTALSCORE: 0
FACIALEXPRESSION: 0

## 2020-09-22 NOTE — ED PROVIDER NOTES
[facility] Emergency Department    CHIEF COMPLAINT  Fever (from [facility], normally oriented to self. Staff states pt has been increasingly lethargic over last few days, fever 102.2 prior to arrival)      HISTORY OF PRESENT ILLNESS  Ernestina Vega is a 80 y.o. female who presents to the ED complaining of fever per nursing facility. Patient has baseline of dementia and is from [facility]. Patient is normally oriented to self, nursing staff reports that they have noticed a change in her mental status and becoming weaker over the last several days. Nursing staff reported a fever of 102.2 prior to arrival.  No other reported cough, diarrhea, emesis. No known COVID-19 exposure at facility. No other complaints, modifying factors or associated symptoms. Nursing notes reviewed. Past Medical History:   Diagnosis Date    Alzheimer's dementia ([facility] [address]     Anxiety     CHF (congestive heart failure) ([facility])     Chronic pain     Constipation     Depression     Dysphagia     ESBL (extended spectrum beta-lactamase) producing bacteria infection 10/28/2019    GI bleed     Hyperlipidemia     Hypertension     Hypothyroid     Insomnia     Mood disorder ([facility])     Osteoarthritis     PVD (peripheral vascular disease) ([facility] [address]      Past Surgical History:   Procedure Laterality Date    UPPER GASTROINTESTINAL ENDOSCOPY  09/29/2016    ulcer    UPPER GASTROINTESTINAL ENDOSCOPY  06/2017    no specimens     History reviewed. No pertinent family history. Social History     Socioeconomic History    Marital status:       Spouse name: Not on file    Number of children: Not on file    Years of education: Not on file    Highest education level: Not on file   Occupational History    Not on file   Social Needs    Financial resource strain: Not on file    Food insecurity     Worry: Not on file     Inability: Not on file    Transportation needs     Medical: Not on file Non-medical: Not on file   Tobacco Use    Smoking status: Unknown If Ever Smoked    Smokeless tobacco: Never Used   Substance and Sexual Activity    Alcohol use: No    Drug use: No    Sexual activity: Not on file   Lifestyle    Physical activity     Days per week: Not on file     Minutes per session: Not on file    Stress: Not on file   Relationships    Social connections     Talks on phone: Not on file     Gets together: Not on file     Attends Church service: Not on file     Active member of club or organization: Not on file     Attends meetings of clubs or organizations: Not on file     Relationship status: Not on file    Intimate partner violence     Fear of current or ex partner: Not on file     Emotionally abused: Not on file     Physically abused: Not on file     Forced sexual activity: Not on file   Other Topics Concern    Not on file   Social History Narrative    Not on file     Current Facility-Administered Medications   Medication Dose Route Frequency Provider Last Rate Last Dose    sodium chloride flush 0.9 % injection 10 mL  10 mL Intravenous 2 times per day Radha Moreno A Howard, DO        sodium chloride flush 0.9 % injection 10 mL  10 mL Intravenous PRN Maty Villar Howard, DO        acetaminophen (TYLENOL) tablet 650 mg  650 mg Oral Q6H PRN Ahmad A Howard, DO        Or    acetaminophen (TYLENOL) suppository 650 mg  650 mg Rectal Q6H PRN Ahmad A Howard, DO        bisacodyl (DULCOLAX) EC tablet 5 mg  5 mg Oral Daily PRN Radha ALMAZAN Howard, DO        promethazine (PHENERGAN) tablet 12.5 mg  12.5 mg Oral Q6H PRN Ahmad A Howard, DO        Or    ondansetron (ZOFRAN) injection 4 mg  4 mg Intravenous Q6H PRN Ahmad A Howard, DO        famotidine (PEPCID) tablet 20 mg  20 mg Oral Daily Ahmad A Howard, DO         No Known Allergies    REVIEW OF SYSTEMS  10 systems reviewed, pertinent positives per HPI otherwise noted to be negative    PHYSICAL EXAM  BP (!) 172/72   Pulse 72   Temp 98.2 °F (36.8 °C) (Oral)   Resp 20   Ht 5' 5\" (1.651 m)   Wt 154 lb (69.9 kg)   SpO2 97%   BMI 25.63 kg/m²   GENERAL APPEARANCE: Awake and alert, drowsy. Afebrile. HEAD: Normocephalic. Atraumatic. EYES: PERRL. EOM's grossly intact. ENT: Mucous membranes are dry. NECK: Supple. Normal ROM. HEART: RRR. Distal pulses are equal and intact. Cap refill less than 2 seconds. LUNGS: Respirations unlabored. CTAB. Good air exchange. Speaking comfortably in full sentences. ABDOMEN: Soft. Non-distended. Non-tender. No guarding or rebound. No masses. No organomegaly. No rigidity. Bowel sounds present all 4 quadrants. EXTREMITIES: No peripheral edema. Moves all extremities equally. All extremities neurovascularly intact. SKIN: Warm and dry. No acute rashes. NEUROLOGICAL: Alert and oriented. No gross facial drooping. Strength 5/5, sensation intact. PSYCHIATRIC: Normal mood and affect. SCREENINGS       RADIOLOGY  Ct Head Wo Contrast    Result Date: 9/22/2020  EXAMINATION: CT OF THE HEAD WITHOUT CONTRAST  9/22/2020 12:52 am TECHNIQUE: CT of the head was performed without the administration of intravenous contrast. Dose modulation, iterative reconstruction, and/or weight based adjustment of the mA/kV was utilized to reduce the radiation dose to as low as reasonably achievable. COMPARISON: January 11, 2012 HISTORY: ORDERING SYSTEM PROVIDED HISTORY: encephalopathy TECHNOLOGIST PROVIDED HISTORY: Reason for exam:->encephalopathy Has a \"code stroke\" or \"stroke alert\" been called? ->No Reason for Exam: encephalopathy FINDINGS: BRAIN/VENTRICLES: No acute intracranial hemorrhage, mass effect, or midline shift is present. Generalized atrophy and scattered senescent white matter changes are present. Large area of decreased attenuation is seen involving the left basal ganglia region, involving the caudate nucleus, and lentiform nucleus. This may represent an area of acute or subacute infarct.   Scattered senescent white matter changes are present. 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 ORBITS: The visualized portion of the orbits demonstrate no acute abnormality. SINUSES: Partial opacification of the right frontal recess is noted. SOFT TISSUES/SKULL:  No acute abnormality of the visualized skull or soft tissues. 1. Large area of acute to subacute nonhemorrhagic infarct involving the left basal ganglia region and caudate nucleus. MR imaging of the brain would be helpful to further evaluate the area of infarct. 2. 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 HISTORY: ORDERING SYSTEM PROVIDED HISTORY: fever TECHNOLOGIST PROVIDED HISTORY: Reason for exam:->fever FINDINGS: No infiltrate or consolidation or effusion is identified. The heart size is normal.  The patient is status post left shoulder arthroplasty. Vascular calcifications are noted. No acute abnormality visualized. CRITICAL CARE TIME  Total Critical Care time was 35 minutes, excluding separately reportable procedures. There was a high probability of clinically significant/life threatening deterioration in the patient's condition which required my urgent intervention. CONSULTS  IP CONSULT TO HOSPITALIST  IP CONSULT TO NEUROLOGY    ED COURSE/MDM  Patient seen and evaluated. Old records reviewed. Diagnostic testing reviewed and results discussed. I have seen this patient in collaboration with supervising physician Dr. Carlos Roman. We thoroughly discussed the history, physical exam, diagnostic testing and emergency department course. Hung Mcgarry presented to the ED today with above noted complaints. Patient initially given Tylenol for fever, sodium chloride bolus, and Zosyn and vancomycin for suspected sepsis. CBC and CMP no acute kidney injury, mild leukocytosis of 11.3, no bandemia, no anemia. Urinalysis notable for positive nitrates, large amount of leukocytes, greater than 100 WBCs. Patient does have a history of ESBL contaminated urine. Patient also given meropenem. Chest x-ray shows no acute abnormality. EKG interpreted by my attending physician. Patient admitted to the hospital for further management. While in ED patient received Tylenol, sodium chloride, vancomycin, Zosyn, meropenem. A discussion was had with the patient and/or their surrogate regarding diagnosis, diagnostic testing results, treatment/ plan of care. There was shared decision-making between myself as well as the patient and/or their surrogate and we are all in agreement with hospital admission. There was an opportunity for questions and all questions were answered to the best of my ability and to the satisfaction of the patient and/or patient family.      Results for orders placed or performed during the hospital encounter of 09/21/20   CBC Auto Differential   Result Value Ref Range    WBC 11.3 (H) 4.0 - 11.0 K/uL    RBC 4.31 4.00 - 5.20 M/uL    Hemoglobin 13.9 12.0 - 16.0 g/dL    Hematocrit 42.0 36.0 - 48.0 %    MCV 97.4 80.0 - 100.0 fL    MCH 32.2 26.0 - 34.0 pg    MCHC 33.1 31.0 - 36.0 g/dL    RDW 12.6 12.4 - 15.4 %    Platelets 651 512 - 628 K/uL    MPV 9.0 5.0 - 10.5 fL    Neutrophils % 78.0 %    Lymphocytes % 14.5 %    Monocytes % 6.9 %    Eosinophils % 0.0 %    Basophils % 0.6 %    Neutrophils Absolute 8.8 (H) 1.7 - 7.7 K/uL    Lymphocytes Absolute 1.6 1.0 - 5.1 K/uL    Monocytes Absolute 0.8 0.0 - 1.3 K/uL    Eosinophils Absolute 0.0 0.0 - 0.6 K/uL    Basophils Absolute 0.1 0.0 - 0.2 K/uL   Comprehensive Metabolic Panel w/ Reflex to MG   Result Value Ref Range    Sodium 135 (L) 136 - 145 mmol/L    Potassium reflex Magnesium 4.2 3.5 - 5.1 mmol/L    Chloride 99 99 - 110 mmol/L    CO2 24 21 - 32 mmol/L    Anion Gap 12 3 - 16 Glucose 135 (H) 70 - 99 mg/dL    BUN 21 (H) 7 - 20 mg/dL    CREATININE 0.8 0.6 - 1.2 mg/dL    GFR Non-African American >60 >60    GFR African American >60 >60    Calcium 8.9 8.3 - 10.6 mg/dL    Total Protein 7.3 6.4 - 8.2 g/dL    Alb 3.8 3.4 - 5.0 g/dL    Albumin/Globulin Ratio 1.1 1.1 - 2.2    Total Bilirubin 0.7 0.0 - 1.0 mg/dL    Alkaline Phosphatase 93 40 - 129 U/L    ALT 17 10 - 40 U/L    AST 38 (H) 15 - 37 U/L    Globulin 3.5 g/dL   Lactate, Sepsis   Result Value Ref Range    Lactic Acid, Sepsis 1.6 0.4 - 1.9 mmol/L   Urinalysis Reflex to Culture    Specimen: Urine, clean catch   Result Value Ref Range    Color, UA Yellow Straw/Yellow    Clarity, UA CLOUDY (A) Clear    Glucose, Ur Negative Negative mg/dL    Bilirubin Urine Negative Negative    Ketones, Urine Negative Negative mg/dL    Specific Gravity, UA 1.015 1.005 - 1.030    Blood, Urine SMALL (A) Negative    pH, UA 7.0 5.0 - 8.0    Protein, UA TRACE (A) Negative mg/dL    Urobilinogen, Urine 0.2 <2.0 E.U./dL    Nitrite, Urine POSITIVE (A) Negative    Leukocyte Esterase, Urine LARGE (A) Negative    Microscopic Examination YES     Urine Type NotGiven     Urine Reflex to Culture Yes    COVID-19   Result Value Ref Range    SARS-CoV-2, NAAT Not Detected Not Detected    SARS-CoV-2, PCR Not performed Not Detected   Microscopic Urinalysis   Result Value Ref Range    WBC, UA >100 (A) 0 - 5 /HPF    RBC, UA 3-4 0 - 4 /HPF    Epithelial Cells, UA 2-5 0 - 5 /HPF    Bacteria, UA 4+ (A) None Seen /HPF   Blood gas, venous   Result Value Ref Range    pH, Florian 7.673 (HH) 7.350 - 7.450    pCO2, Florian 14.6 (L) 40.0 - 50.0 mmHg    pO2, Florian 116.2 (H) 25.0 - 40.0 mmHg    HCO3, Venous 16.6 (L) 23.0 - 29.0 mmol/L    Base Excess, Florian -4.3 (L) -3.0 - 3.0 mmol/L    O2 Sat, Florian 96 Not Established %    Carboxyhemoglobin 9.2 (H) 0.0 - 1.5 %    MetHgb, Florian 3.1 (H) <1.5 %    TC02 (Calc), Florian 17 Not Established mmol/L    O2 Content, Florian 5 Not Established VOL %    O2 Therapy Unknown    EKG 12 Lead   Result Value Ref Range    Ventricular Rate 100 BPM    Atrial Rate 100 BPM    P-R Interval 178 ms    QRS Duration 80 ms    Q-T Interval 374 ms    QTc Calculation (Bazett) 482 ms    P Axis 94 degrees    R Axis -23 degrees    T Axis 13 degrees    Diagnosis       Normal sinus rhythmVoltage criteria for left ventricular hypertrophyAbnormal ECGWhen compared with ECG of 29-OCT-2019 08:42,No significant change was found       I spoke with Dr. Dana David. We thoroughly discussed the history, physical exam, laboratory and imaging studies, as well as, emergency department course. Based upon that discussion, we've decided to admit Chano Pascual for further observation and evaluation of Shonda Strauss's urosepsis. As I have deemed necessary from their history, physical, and studies, I have considered and evaluated Chano Pascual for the following diagnoses: UTI, pneumonia, sepsis, anemia, COVID-19      FINAL IMPRESSION  1. Sepsis, due to unspecified organism, unspecified whether acute organ dysfunction present ([facility] [address]    2. Acute cystitis with hematuria        Vitals:  Blood pressure (!) 172/72, pulse 72, temperature 98.2 °F (36.8 °C), temperature source Oral, resp. rate 20, height 5' 5\" (1.651 m), weight 154 lb (69.9 kg), SpO2 97 %, not currently breastfeeding. DISPOSITION  Patient was admitted to the hospital in stable condition. Comment: Please note this report has been produced using speech recognition software and may contain errors related to that system including errors in grammar, punctuation, and spelling, as well as words and phrases that may be inappropriate. If there are any questions or concerns please feel free to contact the dictating provider for clarification.             [address] Babcock, APRN - CNP  09/22/20 9036

## 2020-09-22 NOTE — DISCHARGE INSTR - COC
Continuity of Care Form    Patient Name: Mike Day   :  1922  MRN:  [id number]    Admit date:  2020  Discharge date:        Code Status Order: DNR-CCA   Advance Directives:   [facility] Documentation       Date/Time Healthcare Directive Type of Healthcare Directive Copy in [facility] [address] Agent's Name Healthcare Agent's Phone Number    [phone]  Yes, patient has an advance directive for healthcare treatment  Health care treatment directive  Yes, copy in chart  --  --  --            Admitting Physician:  Kwame Stoddard DO  PCP: Nilda Hou MD    Discharging Nurse: Jing Ge [facility] Unit/Room#: 1324/3964-16  Discharging Unit Phone Number: [phone]    Emergency Contact:   Extended Emergency Contact Information  Primary Emergency Contact: Vitaliy Gallego  Address: [address] [place] [address], [address] [place] [address] Phone: [phone]  Work Phone: [phone]  Relation: Legal Guardian    Past Surgical History:  Past Surgical History:   Procedure Laterality Date    UPPER GASTROINTESTINAL ENDOSCOPY  2016    ulcer    UPPER GASTROINTESTINAL ENDOSCOPY  2017    no specimens       Immunization History: There is no immunization history on file for this patient.     Active Problems:  Patient Active Problem List   Diagnosis Code    Black stool K92.1    GI bleed K92.2    Acute blood loss anemia D62    PVD (peripheral vascular disease) ([facility]) I73.9    Hypertension I10    Dementia without behavioral disturbance ([facility]) F03.90    Acute respiratory failure with hypoxia and hypercapnia ([facility]) J96.01, J96.02    Abnormal CT of the chest R93.89    Tracheobronchitis J40    Pulmonary congestion R09.89    Septicemia ([facility]) A41.9    QT prolongation R94.31    Cystitis with hematuria N30.91    Sepsis secondary to UTI ([facility]) A41.9, N39.0    Aspiration pneumonia ([facility]) J69.0    UTI (urinary tract infection) N39.0       Isolation/Infection:   Isolation            No Isolation          Patient Infection Status       Infection Onset Added Last Indicated Last Indicated By Review Planned Expiration Resolved Resolved By    None active    Resolved    COVID-19 Rule Out 09/21/20 09/21/20 09/21/20 COVID-19 (Ordered)   09/22/20 Rule-Out Test Resulted    ESBL (Extended Spectrum Beta Lactamase) 10/28/19 10/31/19 10/28/19 Urine Culture   09/22/20 Beto Cole RN            Nurse Assessment:  Last Vital Signs: BP (!) 145/82   Pulse 85   Temp 99.1 °F (37.3 °C) (Oral)   Resp 14   Ht 5' 5\" (1.651 m)   Wt 160 lb 0.9 oz (72.6 kg)   SpO2 95%   BMI 26.63 kg/m²     Last documented pain score (0-10 scale): Pain Level: 0  Last Weight:   Wt Readings from Last 1 Encounters:   09/22/20 160 lb 0.9 oz (72.6 kg)     Mental Status:  disoriented    IV Access:  - None    Nursing Mobility/ADLs:  Walking   Dependent  Transfer  Dependent  Bathing  Dependent  Dressing  Dependent  Toileting  Dependent  Feeding  Dependent  Med Admin  Dependent  Med Delivery   prefers mixed with apple sauce    Wound Care Documentation and Therapy:        Elimination:  Continence:   · Bowel: No  · Bladder: NO  Urinary Catheter: Insertion Date: ***   Colostomy/Ileostomy/Ileal Conduit: No       Date of Last BM: ***    Intake/Output Summary (Last 24 hours) at 9/22/2020 1135  Last data filed at 9/22/2020 0610  Gross per 24 hour   Intake 352 ml   Output 850 ml   Net -498 ml     I/O last 3 completed shifts: In: 352 [I.V.:352]  Out: 850 [Urine:850]    Safety Concerns:      At Risk for Falls    Impairments/Disabilities:      Speech, Vision and Hearing    Nutrition Therapy:  Current Nutrition Therapy:   - Oral Diet:  NPO    Routes of Feeding: Oral  Liquids: Honey Thick Liquids  Daily Fluid Restriction: no  Last Modified Barium Swallow with Video (Video Swallowing Test): not done    Treatments at the Time of Hospital Discharge:   Respiratory Treatments: ***  Oxygen Therapy:  is not on home oxygen therapy. Ventilator:    - No ventilator support    Rehab Therapies: hospice  Weight Bearing Status/Restrictions: No weight bearing restirctions  Other Medical Equipment (for information only, NOT a DME order):  wheelchair  Other Treatments: ***    Patient's personal belongings (please select all that are sent with patient):  None, Dentures upper    RN SIGNATURE:  Electronically signed by Marshall Banks RN on 9/25/20 at 12:26 PM EDT    CASE MANAGEMENT/SOCIAL WORK SECTION    Inpatient Status Date: 9/22/20    Readmission Risk Assessment Score:  Readmission Risk              Risk of Unplanned Readmission:        12           Discharging to Facility/ Agency   · Name: [facility]  · Address:  · Phone:[phone]  · Fax:[phone]        / signature: {Esignature:[id number]}    PHYSICIAN SECTION    Prognosis: Poor    Condition at Discharge: Terminal    Recommended Follow-up, Labs or Other Treatments After Discharge:    Nursing care/Hospice Care             Physician Certification: I certify the above information and transfer of Hung Mcgarry  is necessary for the continuing treatment of the diagnosis listed and that she requires Hospice/long term care for greater 30 days.      Update Admission H&P: No change in H&P    PHYSICIAN SIGNATURE:  Electronically signed by JUAN C Diallo CNP on 9/25/20 at 10:20 AM EDT

## 2020-09-22 NOTE — H&P
Hospital Medicine History & Physical      PCP: Nilda Hou MD    Date of Admission: 9/21/2020    Date of Service: Pt seen/examined on   Pt seen/examined face to face on and admitted as inpatient with expected LOS greater than two midnights due to medical therapy    Chief Complaint:    fever    History Of Present Illness:      History is limited due to patient's encephalopathic thus the nursing facility was called and history obtained    80 y.o. female who presented to [facility] from nursing facility baseline wheelchair-bound, able to feed herself and converse. Patient today was noted to be normal throughout and then suddenly around 7 PM the nursing noted the patient was lethargic and thus was evaluated with vital signs at the time 102.2 temp, and /73 13, and 02 90% RA, patient was not responding to command and encephalopathic in addition to shaking uncontrollable, with no incontinence, trauma or fall. CODE STATUS was confirmed to be DNR CCA. POA is [facility] home phone [phone]. At the facility residence usually get tested regularly for COVID on Wednesday. Patient's last COVID tested came back negative this Friday. Past Medical History:          Diagnosis Date    [facility]'s dementia [facility]     Anxiety     [facility]     Chronic pain     Constipation     Depression     Dysphagia     ESBL (extended spectrum beta-lactamase) producing bacteria infection 10/28/2019    GI bleed     Hyperlipidemia     Hypertension     Hypothyroid     Insomnia     Mood disorder ([facility]     Osteoarthritis     PVD (peripheral vascular disease) ([facility] [address]        Past Surgical History:          Procedure Laterality Date    UPPER GASTROINTESTINAL ENDOSCOPY  09/29/2016    ulcer    UPPER GASTROINTESTINAL ENDOSCOPY  06/2017    no specimens       Medications Prior to Admission:      Prior to Admission medications    Medication Sig Start Date End Date Taking?  Authorizing Provider   albuterol (PROVENTIL) (5 MG/ML) 0.5% nebulizer solution Take 2.5 mg by nebulization every 6 hours as needed for Wheezing    Historical Provider, MD   ipratropium-albuterol (DUONEB) 0.5-2.5 (3) MG/3ML SOLN nebulizer solution Inhale 1 vial into the lungs every 4 hours as needed     Historical Provider, MD   miconazole (MICOTIN) 2 % cream Apply 1 applicator topically 2 times daily Apply topically 2 times daily. Historical Provider, MD   acetaminophen (TYLENOL) 325 MG tablet Take 650 mg by mouth every 6 hours as needed. Historical Provider, MD   Skin Protectants, Misc. (EUCERIN) cream Apply  topically as needed. Apply to bilateral lower extremities twice weekly after showering/bathing (Wed/Sat). Historical Provider, MD       Allergies:  Patient has no known allergies. Social History:          TOBACCO:   has an unknown smoking status. She has never used smokeless tobacco.  ETOH:   reports no history of alcohol use. E-Cigarettes Vaping or Juuling     Questions Responses    Vaping Use     Start Date     Does device contain nicotine? Quit Date     Vaping Type             Family History:      Reviewed in detail     History reviewed. No pertinent family history. REVIEW OF SYSTEMS:     Unable to obtain accurately due to patient's clinical condition    PHYSICAL EXAM PERFORMED:    BP (!) 177/69   Pulse 97   Temp 101.8 °F (38.8 °C) (Oral)   Resp 18   Ht 5' 5\" (1.651 m)   Wt 154 lb (69.9 kg)   SpO2 97%   BMI 25.63 kg/m²     General appearance: Elderly, laying in bed eyes open  HEENT:   atraumatic, sclera anicteric, Conjunctivae clear. Neck: Supple,Trachea midline, no goiter  Respiratory:  Normal respiratory effort. Clear to auscultation, bilaterally without Rales/Wheezes/Rhonchi. Cardiovascular:  Regular rate and rhythm without murmurs, capillary refill 2 seconds  Abdomen: Soft, non-tender, non-distended with normal bowel sounds.   Musculoskeletal:  No clubbing, cyanosis or edema bilaterally. Skin: turgor normal.  No new rashes or lesions. Neurologic: Alert and oriented x0, patient would move her arms non-purposely and wasn't able to measure strength but noted tremerolous right arm. Labs:     Recent Labs     09/21/20  2228   WBC 11.3*   HGB 13.9   HCT 42.0        Recent Labs     09/21/20  2228   *   K 4.2   CL 99   CO2 24   BUN 21*   CREATININE 0.8   CALCIUM 8.9     Recent Labs     09/21/20  2228   AST 38*   ALT 17   BILITOT 0.7   ALKPHOS 93     No results for input(s): INR in the last 72 hours. No results for input(s): Akash Keel in the last 72 hours. Urinalysis:      Lab Results   Component Value Date    NITRU POSITIVE 09/21/2020    WBCUA >100 09/21/2020    BACTERIA 4+ 09/21/2020    RBCUA 3-4 09/21/2020    BLOODU SMALL 09/21/2020    SPECGRAV 1.015 09/21/2020    GLUCOSEU Negative 09/21/2020    GLUCOSEU NEGATIVE 06/09/2010       Radiology:     CXR: I have reviewed the CXR with the following interpretation: No acute process    EKG:  I have reviewed the EKG with the following interpretation:   Normal sinus rhythm    XR CHEST PORTABLE   Final Result   No acute abnormality visualized. ASSESSMENT AND PLAN:    There are no active hospital problems to display for this patient. 1.Encephalopathy:  Suspected metabolic secondary to UTI but given sudden onset of symptoms with nromal baseline when speaking with NH there is concern for cva. Medication reviewed  CT head, labs pending  Antibiotics given and check response    2. Sepsis secondary to urinary tract infection:  HR over 90, temperature one 1.8 and at the facility over 102, No lactic acidosis, source urinary   Meropenem started due to prior hx of ESBL     3. Suspected ESBL acute urinary tract infection:  Urine culture pending, continue IV antibiotics as above    4.   Uncontrolled hypertension:  Hold home medication- pending CT head    Chronic medical conditions:  Hyperlipidemia: Continue medication  Insomnia: Held home medication  Depression: Hold home medication  Anxiety: Hold home medication      Diet: NPO except meds ordered    PT/OT Eval Status: consulted    DVT Prophylaxis: Held    Dispo:   Expected LOS greater than two [address] [facility], DO

## 2020-09-22 NOTE — PROGRESS NOTES
Physical Therapy    Facility/Department: [facility] C3 TELE/MED SURG/ONC  Initial Assessment and Discharge Summary    NAME: Darlene Sousa  : 1922  MRN: [id number]    Date of Service: 2020    Discharge Recommendations:  ECF without PT   PT Equipment Recommendations  Equipment Needed: No    Assessment    Body structures, Functions, Activity limitations: Decreased functional mobility ; Decreased cognition;Decreased posture;Decreased high-level IADLs;Decreased ADL status; Decreased endurance;Decreased ROM; Decreased strength;Decreased balance;Decreased safe awareness  Assessment: Patient seen for PT evaluation and transfer training. Patient cleared by RN for therapy participation this date. Patient agreeable to therapy. Patient admitted for fever with [facility] dementia and long term care resident at facility with pt w/c bound at [facility]. Pt completed supine->sit and squat pivot toward R with total A x2 to chair. Pt minimally verbalizing during session. Pt appears to be at baseline for mobility. PT eval only. Recommending DC to ECF without PT. Treatment Diagnosis: decreased independence with mobility  Prognosis: Poor  Decision Making: Medium Complexity  PT Education: Goals;Transfer Training;Disease Specific Education;PT Role;Energy Conservation; Functional Mobility Training;Plan of Care;General Safety;Precautions  Patient Education: educated on benefits of sitting up in chair  Barriers to Learning: cognition  REQUIRES PT FOLLOW UP: No  Activity Tolerance  Activity Tolerance: Patient limited by cognitive status; Patient Tolerated treatment well  Activity Tolerance: VSS, denies lightheadedness       Patient Diagnosis(es): The primary encounter diagnosis was Sepsis, due to unspecified organism, unspecified whether acute organ dysfunction present ([facility] [address] A diagnosis of Acute cystitis with hematuria was also pertinent to this visit.      has a past medical history of Alzheimer's dementia ([facility] [address] Anemia, Anxiety, Cataract, CHF (congestive heart failure) (HCC), Chronic pain, Constipation, Dementia ([facility] [address] Depression, Dysphagia, ESBL (extended spectrum beta-lactamase) producing bacteria infection, GI bleed, Hyperlipidemia, Hypertension, Hypothyroid, Insomnia, Mood disorder ([facility] [address] Osteoarthritis, and PVD (peripheral vascular disease) ([address] has a past surgical history that includes Upper gastrointestinal endoscopy (09/29/2016) and Upper gastrointestinal endoscopy (06/2017).     Restrictions  Restrictions/Precautions  Restrictions/Precautions: Fall Risk, Up as Tolerated  Position Activity Restriction  Other position/activity restrictions: telemetry        Subjective  General  Chart Reviewed: Yes  Patient assessed for rehabilitation services?: Yes  Additional Pertinent Hx: dementia  Response To Previous Treatment: Not applicable  Family / Caregiver Present: No  Referring Practitioner: Umm Marsh  Referral Date : 09/22/20  Diagnosis: fever  Follows Commands: Impaired  Subjective  Subjective: pt in bed, minimally verbalizes but cooperative  Pain Screening  Patient Currently in Pain: Denies  Vital Signs  Patient Currently in Pain: Denies       Orientation  Orientation  Overall Orientation Status: Impaired(difficult to assess as pt minimally verbalizing)  Social/Functional History  Social/Functional History  Lives With: Other (comment)  Type of Home: Facility([facility])  ADL Assistance: Needs assistance  Bath: Dependent/Total  Dressing: Dependent/Total  Grooming: Maximum assistance  Feeding: Stand by assistance  Toileting: Needs assistance  Homemaking Responsibilities: No  Ambulation Assistance: Needs assistance(non-ambulatory; per previous therapy notes pt self-propels using BLE.s)  Transfer Assistance: Needs assistance  Active : No  Cognition   Cognition  Overall Cognitive Status: Exceptions  Following Commands: Does not follow commands  Memory: Decreased short term memory  Insights: Not aware of deficits  Initiation: Requires cues for all  Sequencing: Requires cues for all    Objective     Observation/Palpation  Posture: Poor    AROM RLE (degrees)  RLE AROM: Exceptions  RLE General AROM: limited ~50%  AROM LLE (degrees)  LLE AROM : Exceptions  LLE General AROM: limited ~50%  Strength RLE  Strength RLE: Exception  Comment: did not formally assess, appears <3/5 with functional mobility  Strength LLE  Strength LLE: Exception  Comment: did not formally assess, appears <3/5 with functional mobility        Bed mobility  Supine to Sit: Dependent/Total;2 Person assistance(total Ax2)  Sit to Supine: Unable to assess(up in chair at end of session with sling under pt)  Scooting: Dependent/Total(to EOB)  Transfers  Sit to Stand: 2 Person Assistance;Dependent/Total  Stand to sit: Dependent/Total;2 Person Assistance  Bed to Chair: Dependent/Total;2 Person Assistance  Lateral Transfers: 2 Person Assistance;Dependent/Total  Comment: pt completed squat pivot transfer with total Ax2 toward R bed->chair  Ambulation  Ambulation?: No  Stairs/Curb  Stairs?: No              Plan   Plan  Times per week: PT eval only  Safety Devices  Type of devices:  All fall risk precautions in place, Patient at risk for falls, Call light within reach, Left in chair, Chair alarm in place, Gait belt, Nurse notified  Restraints  Initially in place: No    AM-PAC Score     AM-PAC Inpatient Mobility without Stair Climbing Raw Score : 8 (09/22/20 1142)  AM-PAC Inpatient without Stair Climbing T-Scale Score : 30.65 (09/22/20 1142)  Mobility Inpatient CMS 0-100% Score: 80.91 (09/22/20 1142)  Mobility Inpatient without Stair CMS G-Code Modifier : CM (09/22/20 1142)       Goals  Short term goals  Time Frame for Short term goals: 9/29/20  Short term goal 1: Pt will complete bed mobility with total Ax2; goal met 9/22 - pt completes bed mobility with total A x2  Short term goal 2: Pt will complete squat pivot  transfer with total A x2; goal met 9/22 - pt completes squat pivot transfer with total Ax2  Patient Goals   Patient goals : Pt did not state       Therapy Time   Individual Concurrent Group Co-treatment   Time In 1028         Time Out 1051         Minutes 23         Timed Code Treatment Minutes: [address], PT

## 2020-09-22 NOTE — PROGRESS NOTES
Speech Language Pathology  Facility/Department: [facility] C3 TELE/MED SURG/ONC   CLINICAL BEDSIDE SWALLOW EVALUATION    NAME: Hung Mcgarry  : 1922  MRN: [id number]    Recommendations  -Continue mildly thick (nectar) liquids via spoon with puree, medications crushed in puree  -Frequent oral care with attempts to remove and clean dentures, unable to be removed this date. -If patient presents with s/s of penetration/aspiration, recommend NPO and contact SLP    ADMISSION DATE: 2020  ADMITTING DIAGNOSIS: has Black stool; GI bleed; Acute blood loss anemia; PVD (peripheral vascular disease) ([address] Hypertension; Dementia without behavioral disturbance ([address] Acute respiratory failure with hypoxia and hypercapnia ([address] Abnormal CT of the chest; Tracheobronchitis; Pulmonary congestion; Septicemia ([address] QT prolongation; Cystitis with hematuria; Sepsis secondary to UTI [facility]); Aspiration pneumonia ([address] and UTI (urinary tract infection) on their problem list.  ONSET DATE: 20    Recent Chest Xray/CT of Chest: 20  FINDINGS:    No infiltrate or consolidation or effusion is identified.  The heart size is    normal.  The patient is status post left shoulder arthroplasty.  Vascular    calcifications are noted.              Impression    No acute abnormality visualized. Date of Eval: 2020  Evaluating Therapist: Mel Desir    Current Diet level:  Current Diet : NPO(pending BSE)  Current Liquid Diet : NPO      Primary Complaint  Per MD H&P\"89 y.o. female who presented to [facility] from nursing facility baseline wheelchair-bound, able to feed herself and converse.   Patient today was noted to be normal throughout and then suddenly around 7 PM the nursing noted the patient was lethargic and thus was evaluated with vital signs at the time 102.2 temp, and /73 13, and 02 90% RA, patient was not responding to command and encephalopathic in addition to shaking uncontrollable, with no incontinence, trauma or fall. CODE STATUS was confirmed to be DNR CCA. POA is lenm[facility] home phone [phone]. At the facility residence usually get tested regularly for COVID on Wednesday. Patient's last COVID tested came back negative this Friday. \"    Pain:  Pain Assessment  Pain Assessment: Advanced Dementia  Pain Level: 0  PAINAD (Pain Assessment in Advance Dementia)  Breathing: normal  Negative Vocalization: none  Facial Expression: smiling or inexpressive  Body Language: relaxed  Consolability: no need to console  PAINAD Score: 0    Reason for Referral  Mercedes Conrad was referred for a bedside swallow evaluation to assess the efficiency of her swallow function, identify signs and symptoms of aspiration and make recommendations regarding safe dietary consistencies, effective compensatory strategies, and safe eating environment. Impression  Dysphagia Diagnosis: Moderate pharyngeal stage dysphagia; Moderate oral stage dysphagia; Suspected needs further assessment  Dysphagia Outcome Severity Scale: Level 2: Moderate Severe dysphagia- Maximum assistance or maximum use of strategies with partial PO only     RN approved entry to room. Patient accepting minimal amounts of food. No s/s with NTL via spoon, puree, or ice chip. Did not accept bolus via cup or straw. Upon entry, patient with poor oral care. Unable to remove dentures at this time. White coating noted throughout oral cavity. RN educated on need to remove dentures. Suspect dentures have not been removed in a long period of time to be cleaned. Recommend continuation of current diet, mildly thick (nectar) and puree consistencies, medications crushed in puree. Would require frequent oral care with attempt to remove dentures. Treatment Plan  Requires SLP Intervention: Yes  Duration/Frequency of Treatment: 2-4x/wk for LOS  D/C Recommendations:  To be determined       Recommended Diet and Intervention  Diet Solids Recommendation: Dysphagia Pureed (Dysphagia I)  Liquid Consistency Recommendation: Mildly Thick (Nectar)  Recommended Form of Meds: Crushed in puree as able  Recommendations: Dysphagia treatment  Therapeutic Interventions: Diet tolerance monitoring; Therapeutic PO trials with SLP;Patient/Family education    Compensatory Swallowing Strategies  Compensatory Swallowing Strategies: Alternate solids and liquids;Eat/Feed slowly; No straws;Upright as possible for all oral intake;Remain upright for 30-45 minutes after meals;Small bites/sips    Treatment/Goals  Short-term Goals  Timeframe for Short-term Goals: 5 days (9/27)  Long-term Goals  Timeframe for Long-term Goals: 7 days (9/29)  Goal 1: Pt will tolerate LRD w/o s/s of dysphagia  Dysphagia Goals: The patient will tolerate recommended diet without observed clinical signs of aspiration; The patient/caregiver will demonstrate understanding of compensatory strategies for improved swallowing safety. General  Chart Reviewed: Yes  Comments: Patient presents from nursing home. Per report was on puree and mildly thick (nectar) liquids. Previous SLP services with recommendation of hospice care and dc from services. Previousl NTL with puree. Subjective  Subjective: Patient sitting upright in bed. Able to follow minimal commands. Poor oral care noted with dentures unable to be removed. RN notified  Behavior/Cognition: Alert; Cooperative  Respiratory Status: Room air  O2 Device: None (Room air)  Communication Observation: Aphasia; Non-verbal  Follows Directions: Simple(less than 50%)  Dentition: Dentures top;Poor dental/oral hygeine(unable to be removed, white/yellow coating noted)  Patient Positioning: Upright in bed  Baseline Vocal Quality: (DONNIE)  Volitional Cough: (DONNIE)  Prior Dysphagia History: Previous recommendation of NTL and puree. Was not tolerating in late 2019 and dc with hospice care. POC must have changed and patient previously on NTL and puree  Consistencies Administered: Dysphagia Pureed (Dysphagia I); Ice

## 2020-09-22 NOTE — CARE COORDINATION
CASE MANAGEMENT INITIAL ASSESSMENT      Reviewed chart and completed assessment via telephone with: Luis Colunga Wife he is   Explained Case Management role/services. Primary contact information:Vitaliy Young [address]:   Who do you trust or have selected to make healthcare decisions for you? Name:   Lori Rosales  Phone  Number: [phone]  Can this person be reached and be able to respond quickly, such as within a few minutes or hours? yes    Admit date/status: 9/22/20  Diagnosis: UTI  Is this a Readmission?: n     Insurance: [employer] [address] [place] [address] required for SNF - n       3 night stay required - N    Living arrangements, Adls, care needs, prior to admission: LTC [facility]    Transportation: [address] at home: Walker__Cane__RTS__ BSC__Shower Chair__  02__ HHN__ CPAP__  BiPap__  Hospital Bed__ W/C___ Other__________    Services in the home and/or outpatient, prior to admission: LTC    Dialysis Facility (if applicable) none  · Name:  · Address:  · Dialysis Schedule:  · Phone:  · Fax:    PT/OT recs: none    Hospital Exemption Notification (HEN): not needed    Barriers to discharge: none    Plan/comments: spoke with [facility] and Amanuel Prado. Ok to return to LTC status at [facility]. Will not require any additional covid testing.  Natalie Mendes RN      [facility]OC on chart for MD hillman

## 2020-09-22 NOTE — PROGRESS NOTES
Pt admitted to room 333 from ED. Vital signs are stable. Pt is A/O to person only. Assesment is as charted. Gardiner catheter remains in place with yellow urine with sediment.

## 2020-09-22 NOTE — FLOWSHEET NOTE
09/22/20 1614   Encounter Summary   Services provided to: Patient   Referral/Consult From: [facility]   (9/22/PCA spoke to me about patient.)   Continue Visiting   (9/22/PCA spoke to me for patient.)   Length of Encounter 15 minutes   Spiritual/Anabaptist   Type Spiritual support   Assessment Approachable   Intervention Nurtured hope   Outcome Expressed gratitude;Engaged in conversation

## 2020-09-22 NOTE — ED NOTES
Bed: 18  Expected date:   Expected time:   Means of arrival:   Comments:  46 layson Millan RN  09/21/20 9945

## 2020-09-22 NOTE — PROGRESS NOTES
Hospitalist Progress Note      PCP: Fidel Parsons MD    Date of Admission: 9/21/2020    Chief Complaint: confusion     Hospital Course:     Subjective:     Still very sedated this morning but does awaken to stimuli. No acute events overnight. Medications:  Reviewed    Infusion Medications   Scheduled Medications    sodium chloride flush  10 mL Intravenous 2 times per day    famotidine  20 mg Oral Daily    meropenem  1 g Intravenous Q12H    vancomycin (VANCOCIN) intermittent dosing (placeholder)   Other RX Placeholder    vancomycin  1,000 mg Intravenous Q24H     PRN Meds: sodium chloride flush, acetaminophen **OR** acetaminophen, bisacodyl, promethazine **OR** ondansetron      Intake/Output Summary (Last 24 hours) at 9/22/2020 1141  Last data filed at 9/22/2020 0610  Gross per 24 hour   Intake 352 ml   Output 850 ml   Net -498 ml       Physical Exam Performed:    BP (!) 145/82   Pulse 85   Temp 99.1 °F (37.3 °C) (Oral)   Resp 14   Ht 5' 5\" (1.651 m)   Wt 160 lb 0.9 oz (72.6 kg)   SpO2 95%   BMI 26.63 kg/m²     General appearance:  Elderly, chronically ill appearing   HEENT: Pupils equal, round, and reactive to light. Conjunctivae/corneas clear. Neck: Supple, with full range of motion. No jugular venous distention. Trachea midline. Respiratory:  Normal respiratory effort. Clear to auscultation, bilaterally without Rales/Wheezes/Rhonchi. Cardiovascular: Regular rate and rhythm with normal S1/S2 without murmurs, rubs or gallops. Abdomen: Soft, non-tender, non-distended with normal bowel sounds. Gardiner with cloudy/purulent urine   Musculoskeletal: No clubbing, cyanosis or edema bilaterally. Skin: Skin color, texture, turgor normal.  No rashes or lesions. Neurologic:  Neurovascularly intact without any focal sensory/motor deficits.  Cranial nerves: II-XII intact, grossly non-focal.  Psychiatric: Alert and oriented, thought content appropriate, normal insight  Capillary Refill: Brisk,< 3 seconds   Peripheral Pulses: +2 palpable, equal bilaterally       Labs:   Recent Labs     09/21/20 2228 09/22/20  1016   WBC 11.3* 8.0   HGB 13.9 12.2   HCT 42.0 37.0    246     Recent Labs     09/21/20 2228 09/22/20  1016   * 138   K 4.2 3.8   CL 99 105   CO2 24 24   BUN 21* 16   CREATININE 0.8 0.9   CALCIUM 8.9 8.2*     Recent Labs     09/21/20 2228 09/22/20  1016   AST 38* 23   ALT 17 13   BILITOT 0.7 1.0   ALKPHOS 93 75     No results for input(s): INR in the last 72 hours. No results for input(s): [place] in the last 72 hours. Urinalysis:      Lab Results   Component Value Date    NITRU POSITIVE 09/21/2020    WBCUA >100 09/21/2020    BACTERIA 4+ 09/21/2020    RBCUA 3-4 09/21/2020    BLOODU SMALL 09/21/2020    SPECGRAV 1.015 09/21/2020    GLUCOSEU Negative 09/21/2020    GLUCOSEU NEGATIVE 06/09/2010       Radiology:  CT HEAD WO CONTRAST   Final Result   1. Large area of acute to subacute nonhemorrhagic infarct involving the left   basal ganglia region and caudate nucleus. MR imaging of the brain would be   helpful to further evaluate the area of infarct. 2. Focal area of encephalomalacia, left upper jose r, consistent with an area   of old infarct         XR CHEST PORTABLE   Final Result   No acute abnormality visualized. MRI BRAIN WO CONTRAST    (Results Pending)           Assessment/Plan:    Active Hospital Problems    Diagnosis    UTI (urinary tract infection) [N39.0]       Suspected sepsis POA 2/2 acute cystitis - p/w fever, tachycardia, leukocytosis. - continue empiric vanc and meropenem, follow up blood and urine cx   - delirium precautions   - consider LP if worsening symptoms     Acute metabolic encephalopathy - likely 2/2 the above in setting of underlying dementia with evidence of possible subacute infarction on [facility], MRI brain pending to further evaluate for acuity of CVA. Consider LP as above if progressive fever, though no s/s meningismus at this time. Palliative care consulted     Suspect acute/subacute CVA - MRI brain ordered to evaluate further, hold antihypertensives to allow for autoregulation.  PT/OT/ST     Hx ESBL - continue merrem as above         DVT Prophylaxis: lovenox   Diet: Diet NPO, After Midnight Exceptions are: Sips with Meds  Code Status: DNR-CCA    PT/OT Eval Status: consulted     Dispo - > 2 d Srini Rangel MD

## 2020-09-22 NOTE — ED NOTES
Pt from [facility]. Sent over for increased lethargy and c/o chest tightness. Pt not oriented whatsoever. Hx dementia. Able to state birth month and \"yes\" when asked if in pain. Bed alarm active.      Luz German RN  09/21/20 [id number]

## 2020-09-22 NOTE — ED NOTES
Straight stick with 20G in right forearm to obtain second set of blood cultures, site cleaned for 30 seconds with [employer] swab, allowed to dry for 30 seconds. Waste tube obtained prior to blood culture collection. Sent to lab for analysis.       Julio C Mcknight RN  09/21/20 [id number]

## 2020-09-22 NOTE — PROGRESS NOTES
Pt resting in bed with eyes closed. When asked questions pt will shake head yes or no. Will continue to monitor. [place]

## 2020-09-22 NOTE — ED PROVIDER NOTES
I independently performed a history and physical on 2 [facility]. All diagnostic, treatment, and disposition decisions were made by myself in conjunction with the advanced practice provider. Briefly, this is a 80 y.o. female here for fever. Comes from nursing facility. Unable to obtain any history from the patient. On exam, toxic appearing adult female. She is febrile. She is tachycardic. Dry membranes. Urine looks infected. Abdomen does not appear to be tender. Heart and lung sounds are normal    EKG  The Ekg interpreted by me in the absence of a cardiologist shows:  Normal sinus rhythm at a rate of 100 bpm, WA interval QRS QTC normal.  Normal axis. No acute ischemic changes. No significant changes when compared to October 2019          Screenings            Critical Time  None     MDM  Elderly lady who is coming in for sepsis likely secondary to urinary tract infection. Started on antibiotics. COVID-19 test is ordered as she is a nursing home patient. She will need to be admitted to the hospital for further care. Patient Referrals:  No follow-up provider specified. Discharge Medications:  New Prescriptions    No medications on file       FINAL IMPRESSION  1. Sepsis, due to unspecified organism, unspecified whether acute organ dysfunction present ([facility]    2. Acute cystitis with hematuria        Blood pressure (!) 177/69, pulse 97, temperature 101.8 °F (38.8 °C), temperature source Oral, resp. rate 18, height 5' 5\" (1.651 m), weight 154 lb (69.9 kg), SpO2 97 %, not currently breastfeeding. For further details of [facility] emergency department encounter, please see documentation by advanced practice provider.       Siddhartha Mendenhall MD  09/21/20 [id number]

## 2020-09-22 NOTE — CONSULTS
In patient Neurology consult        [facility] Neu[facility]      Miles Farrar, [address] [facility]  4/16/1922    Date of Service: 9/22/2020    Referring Physician: Kleber Orr MD    Most of the history was obtained from detailed chart reviewing and discussion with the patient's primary tream. The patient is currently confused and unable to provide me with accurate history. Reason for the consult and CC: Acute encephalopathy    HPI:   The patient is a 80y.o.  years old female with history of advanced dementia, at baseline she is wheelchair-bound, who came in from her nursing home yesterday with acute encephalopathy. Symptoms started few hours prior to admission. She was observed by nursing home staff to be somewhat lethargic and not following direction. Degree was severe. Duration was persistent. Initial vital signs showed a temperature 102 and blood pressure systolic of 814. No other relieving or aggravating factors. No witnessed seizure or tongue biting or bladder incontinence. No other associated symptoms or triggers. She came to the ED for evaluation. Initial lavatory testing revealed mildly elevated white count. 6 follow-up CT scan of the head showed possible left hemispheric subacute stroke. UTI was positive and she was diagnosed with possible urosepsis. She was admitted to the hospital for further management. She is currently obtunded and unable to give any more history. Other review system was limited. Family history: Noncontributory.      Past Medical History:   Diagnosis Date    Alzheimer's dementia ([address]     Anemia     Anxiety     Cataract     Bilateral    CHF (congestive heart failure) (HCC)     Chronic pain     Constipation     Dementia (HCC)     Depression     Dysphagia     ESBL (extended spectrum beta-lactamase) producing bacteria infection 10/28/2019    GI bleed     Hyperlipidemia     Hypertension     Hypothyroid     Insomnia     Mood disorder ([address]     Vitals:    09/22/20 1305 09/22/20 1306 09/22/20 1307 09/22/20 1308   BP:    (!) 171/68   Pulse: 86 74 71 84   Resp:    16   Temp:    99.7 °F (37.6 °C)   TempSrc:    Axillary   SpO2:    90%   Weight:       Height:           General appearance: sleepy  Eye: PRRR  Fundus: Funduscopic examination could not be performed due to patient's poor cooperation and COVID-19 restriction. Neck: supple  Cardiovascular: No lower leg edema with good pulsation. Mental Status:   She open eyes to voice and goes back to sleep  Does not follow direction  Poor attention  Unable to assess language  Unable to assess fund of knowledge, recent and remote memory due to encephalopathy. Cranial Nerves:   II: Visual fields: NT due to confusion. Pupils: equal, round, reactive to light  III,IV,VI: Extra Ocular Movements are intact. No nystagmus  V: Facial sensation : NT due to confusion  VII: Facial strength and movements: intact and symmetric  VIII: Hearing: NT due to confusion  IX: Palate elevation NT due to confusion  XI: Shoulder shrug: NT due to confusion  XII: Tongue movements: NT due to confusion  Musculoskeletal: She can withdraw to pain from left and right. Tone is normal.  Reflexes: 2+ in the arms and 1+ in the legs  Planters: flexor bilaterally.   Coordination: NT due to confusion  Sensation: NT due to confusion  Gait/Posture: NT due to confusion    Data:  LABS:   Lab Results   Component Value Date     09/22/2020    K 3.8 09/22/2020     09/22/2020    CO2 24 09/22/2020    BUN 16 09/22/2020    CREATININE 0.9 09/22/2020    GFRAA >60 09/22/2020    GFRAA >60 01/11/2012    LABGLOM 58 09/22/2020    GLUCOSE 97 09/22/2020    PHOS 2.3 11/01/2019    MG 2.10 11/01/2019    CALCIUM 8.2 09/22/2020     Lab Results   Component Value Date    WBC 8.0 09/22/2020    RBC 3.78 09/22/2020    HGB 12.2 09/22/2020    HCT 37.0 09/22/2020    MCV 97.8 09/22/2020    RDW 12.4 09/22/2020     09/22/2020     Lab Results   Component Value Date INR 1.22 (H) 06/02/2017    PROTIME 13.8 (H) 06/02/2017       Neuroimaging were independently reviewed by me. Reviewed notes from different physicians  Reviewed lab and blood testing    Impression:  Acute encephalopathy, severe. Possible etiology could include acute metabolic encephalopathy, rule out sepsis, urosepsis and cannot rule out possibility of CNS infection. Possible new ischemic left hemispheric CVA  Advanced age  Baseline dementia of Alzheimer disease, severe  UTI    Recommendation:  The patient has limited  code at this point  Agree with MRI of the brain since COVID-19 titer came back negative  ID work-up with urine and blood cultures  Continue antibiotics  Low threshold for LP if she spiked any fever or white count worsen  Hydration  Neurochecks  Aspiration precautions  DVT and GI prophylaxis  Respiratory support  Blood sugar and blood pressure monitor and control  Prognosis remains guarded  We will follow  Discussed with primary team  MDM: High complexity. Thank you for referring such patient. If you have any questions regarding my consult note, please don't hesitate to call me. Kevin Mattson MD  [phone]    This dictation was generated by voice recognition computer software.  Although all attempts are made to edit the dictation for accuracy, there may be errors in the  transcription that are not intended

## 2020-09-22 NOTE — PLAN OF CARE
Problem: Nutrition  Intervention: Swallowing evaluation  Note: SLP evaluation completed. All further notes may be found in EMR. Key GARCIA [address] [facility].[id number]    Intervention: Aspiration precautions  Note: SLP evaluation completed. All further notes may be found in EMR. Key Meneses  Speech-Language Pathologist .[id number]

## 2020-09-22 NOTE — ED NOTES
Patient identified as a positive fall risk on the ED triage fall screening. Patient placed in fall precautions which includes:  yellow fall risk bracelet on wrist, \"Be Safe\" sign placed on patient's door, and bed alarm placed under patient/alarm turned on. Patient instructed on importance of not getting out of bed or ambulating without assistance for safety.           Rico Fields RN  09/21/20 [id number]

## 2020-09-22 NOTE — PLAN OF CARE
Problem: SKIN INTEGRITY  Goal: Skin integrity is maintained or improved  Outcome: Ongoing     4 Eyes Skin Assessment     The patient is being assess for  Admission    I agree that 2 RN's have performed a thorough Head to Toe Skin Assessment on the patient. ALL assessment sites listed below have been assessed. Areas assessed by both nurses: Deena Nolan RN and Troy Young RN  [x]   Head, Face, and Ears   [x]   Shoulders, Back, and Chest  [x]   Arms, Elbows, and Hands   [x]   Coccyx, Sacrum, and Ischum  [x]   Legs, Feet, and Heels        Does the Patient have Skin Breakdown?   No         Jose F Prevention initiated:  Yes   Wound Care Orders initiated:  NA      [facility] nurse consulted for Pressure Injury (Stage 3,4, Unstageable, DTI, NWPT, and Complex wounds):  NA      Nurse 1 eSignature: Electronically signed by Laura Livingston RN on 9/22/20 at 6:39 AM EDT    **SHARE this note so that the co-signing nurse is able to place an eSignature**    Nurse 2 eSignature: Electronically signed by Troy Young RN on 9/22/20 at 6:40 AM EDT

## 2020-09-22 NOTE — PROGRESS NOTES
Occupational Therapy   Occupational Therapy Initial Assessment/Treatment/Discharge Summary  1x only  Date: 2020   Patient Name: Monalisa Jackson  MRN: [id number]     : 1922    Date of Service: 2020    Discharge Recommendations:  ECF without OT       Assessment   Performance deficits / Impairments: Decreased functional mobility ; Decreased ADL status; Decreased safe awareness;Decreased cognition;Decreased balance  Assessment: Patient admitted with UTI, w/c bound at baseline with totalA for ADLs. Patient today total of 2 with bed mobility and stand pivot transfers as well as ADLs. no further OT needs, will sign off as pt at baseline. No signs of new learning at this time. Prognosis: Good  Decision Making: Medium Complexity  OT Education: OT Role;Plan of Care;Precautions;Transfer Training  Patient Education: disease specific:  Barriers to Learning: decreased cognition  No Skilled OT: At baseline function  REQUIRES OT FOLLOW UP: No  Activity Tolerance  Activity Tolerance: Vitals: Bp 156/66; 154/68  Safety Devices  Safety Devices in place: Yes  Type of devices: Nurse notified;Call light within reach;Gait belt;Left in chair;Chair alarm in place(maxi move sling under pt.)           Patient Diagnosis(es): The primary encounter diagnosis was Sepsis, due to unspecified organism, unspecified whether acute organ dysfunction present ([address] A diagnosis of Acute cystitis with hematuria was also pertinent to this visit. has a past medical history of Alzheimer's dementia ([address] Anemia, Anxiety, Cataract, CHF (congestive heart failure) ([address] Chronic pain, Constipation, Dementia ([address] Depression, Dysphagia, ESBL (extended spectrum beta-lactamase) producing bacteria infection, GI bleed, Hyperlipidemia, Hypertension, Hypothyroid, Insomnia, Mood disorder ([address] Osteoarthritis, and PVD (peripheral vascular disease) ([address]    has a past surgical history that includes Upper gastrointestinal endoscopy (2016) and chair)  Sit to stand: Dependent/Total;2 Person assistance  Stand to sit: 2 Person assistance;Dependent/Total     Cognition  Overall Cognitive Status: Exceptions  Following Commands: Does not follow commands  Memory: Decreased short term memory  Insights: Not aware of deficits  Initiation: Requires cues for all  Sequencing: Requires cues for all         Plan   Plan  Times per week: 1x only    AM-PAC Score        AM-[facility] Inpatient Daily Activity Raw Score: 6 (09/22/20 1124)  AM-PAC Inpatient ADL T-Scale Score : 17.07 (09/22/20 1124)  ADL Inpatient CMS 0-100% Score: 100 (09/22/20 1124)  ADL Inpatient CMS G-Code Modifier : CN (09/22/20 1124)    Goals   1.  Pt will complete functional transfers with totalA-STG met 9/22       Therapy Time   Individual Concurrent Group Co-treatment   Time In 1029         Time Out 1052         Minutes 23         Timed Code Treatment Minutes: Erin Godwin 284       George Gotti, OTR/L

## 2020-09-23 ENCOUNTER — APPOINTMENT (OUTPATIENT)
Dept: GENERAL RADIOLOGY | Age: 85
DRG: 871 | End: 2020-09-23
Payer: MEDICARE

## 2020-09-23 LAB
A/G RATIO: 1.3 (ref 1.1–2.2)
ALBUMIN SERPL-MCNC: 3.6 G/DL (ref 3.4–5)
ALP BLD-CCNC: 73 U/L (ref 40–129)
ALT SERPL-CCNC: 14 U/L (ref 10–40)
ANION GAP SERPL CALCULATED.3IONS-SCNC: 9 MMOL/L (ref 3–16)
AST SERPL-CCNC: 27 U/L (ref 15–37)
BASOPHILS ABSOLUTE: 0.1 K/UL (ref 0–0.2)
BASOPHILS RELATIVE PERCENT: 0.7 %
BILIRUB SERPL-MCNC: 0.9 MG/DL (ref 0–1)
BUN BLDV-MCNC: 17 MG/DL (ref 7–20)
CALCIUM SERPL-MCNC: 8.3 MG/DL (ref 8.3–10.6)
CHLORIDE BLD-SCNC: 104 MMOL/L (ref 99–110)
CO2: 22 MMOL/L (ref 21–32)
CREAT SERPL-MCNC: 0.7 MG/DL (ref 0.6–1.2)
EOSINOPHILS ABSOLUTE: 0.1 K/UL (ref 0–0.6)
EOSINOPHILS RELATIVE PERCENT: 0.7 %
GFR AFRICAN AMERICAN: >60
GFR NON-AFRICAN AMERICAN: >60
GLOBULIN: 2.8 G/DL
GLUCOSE BLD-MCNC: 88 MG/DL (ref 70–99)
HCT VFR BLD CALC: 39.3 % (ref 36–48)
HEMOGLOBIN: 13 G/DL (ref 12–16)
LV EF: 58 %
LVEF MODALITY: NORMAL
LYMPHOCYTES ABSOLUTE: 1.6 K/UL (ref 1–5.1)
LYMPHOCYTES RELATIVE PERCENT: 17.9 %
MAGNESIUM: 2.1 MG/DL (ref 1.8–2.4)
MCH RBC QN AUTO: 32.2 PG (ref 26–34)
MCHC RBC AUTO-ENTMCNC: 33 G/DL (ref 31–36)
MCV RBC AUTO: 97.6 FL (ref 80–100)
MONOCYTES ABSOLUTE: 1.2 K/UL (ref 0–1.3)
MONOCYTES RELATIVE PERCENT: 13.2 %
NEUTROPHILS ABSOLUTE: 6 K/UL (ref 1.7–7.7)
NEUTROPHILS RELATIVE PERCENT: 67.5 %
PDW BLD-RTO: 12.4 % (ref 12.4–15.4)
PLATELET # BLD: 229 K/UL (ref 135–450)
PMV BLD AUTO: 8.9 FL (ref 5–10.5)
POTASSIUM REFLEX MAGNESIUM: 3.3 MMOL/L (ref 3.5–5.1)
RBC # BLD: 4.03 M/UL (ref 4–5.2)
SODIUM BLD-SCNC: 135 MMOL/L (ref 136–145)
TOTAL PROTEIN: 6.4 G/DL (ref 6.4–8.2)
WBC # BLD: 8.9 K/UL (ref 4–11)

## 2020-09-23 PROCEDURE — 2580000003 HC RX 258: Performed by: INTERNAL MEDICINE

## 2020-09-23 PROCEDURE — 6370000000 HC RX 637 (ALT 250 FOR IP): Performed by: INTERNAL MEDICINE

## 2020-09-23 PROCEDURE — 83735 ASSAY OF MAGNESIUM: CPT

## 2020-09-23 PROCEDURE — 87040 BLOOD CULTURE FOR BACTERIA: CPT

## 2020-09-23 PROCEDURE — 80053 COMPREHEN METABOLIC PANEL: CPT

## 2020-09-23 PROCEDURE — 92526 ORAL FUNCTION THERAPY: CPT

## 2020-09-23 PROCEDURE — 1200000000 HC SEMI PRIVATE

## 2020-09-23 PROCEDURE — 36415 COLL VENOUS BLD VENIPUNCTURE: CPT

## 2020-09-23 PROCEDURE — 99232 SBSQ HOSP IP/OBS MODERATE 35: CPT | Performed by: PSYCHIATRY & NEUROLOGY

## 2020-09-23 PROCEDURE — C8929 TTE W OR WO FOL WCON,DOPPLER: HCPCS

## 2020-09-23 PROCEDURE — 6360000004 HC RX CONTRAST MEDICATION: Performed by: INTERNAL MEDICINE

## 2020-09-23 PROCEDURE — 71046 X-RAY EXAM CHEST 2 VIEWS: CPT

## 2020-09-23 PROCEDURE — 85025 COMPLETE CBC W/AUTO DIFF WBC: CPT

## 2020-09-23 PROCEDURE — 6360000002 HC RX W HCPCS: Performed by: INTERNAL MEDICINE

## 2020-09-23 RX ORDER — ASPIRIN 300 MG/1
300 SUPPOSITORY RECTAL DAILY
Status: DISCONTINUED | OUTPATIENT
Start: 2020-09-23 | End: 2020-09-25 | Stop reason: HOSPADM

## 2020-09-23 RX ORDER — ONDANSETRON 2 MG/ML
4 INJECTION INTRAMUSCULAR; INTRAVENOUS EVERY 6 HOURS PRN
Status: DISCONTINUED | OUTPATIENT
Start: 2020-09-23 | End: 2020-09-25 | Stop reason: HOSPADM

## 2020-09-23 RX ORDER — ATORVASTATIN CALCIUM 40 MG/1
40 TABLET, FILM COATED ORAL NIGHTLY
Status: DISCONTINUED | OUTPATIENT
Start: 2020-09-23 | End: 2020-09-25 | Stop reason: HOSPADM

## 2020-09-23 RX ORDER — POTASSIUM CHLORIDE 7.45 MG/ML
10 INJECTION INTRAVENOUS
Status: COMPLETED | OUTPATIENT
Start: 2020-09-23 | End: 2020-09-23

## 2020-09-23 RX ORDER — PROMETHAZINE HYDROCHLORIDE 25 MG/1
12.5 TABLET ORAL EVERY 6 HOURS PRN
Status: DISCONTINUED | OUTPATIENT
Start: 2020-09-23 | End: 2020-09-25 | Stop reason: HOSPADM

## 2020-09-23 RX ORDER — LABETALOL HYDROCHLORIDE 5 MG/ML
10 INJECTION, SOLUTION INTRAVENOUS EVERY 10 MIN PRN
Status: DISCONTINUED | OUTPATIENT
Start: 2020-09-23 | End: 2020-09-25 | Stop reason: HOSPADM

## 2020-09-23 RX ORDER — ASPIRIN 81 MG/1
81 TABLET ORAL DAILY
Status: DISCONTINUED | OUTPATIENT
Start: 2020-09-23 | End: 2020-09-25 | Stop reason: HOSPADM

## 2020-09-23 RX ORDER — POLYETHYLENE GLYCOL 3350 17 G/17G
17 POWDER, FOR SOLUTION ORAL DAILY PRN
Status: DISCONTINUED | OUTPATIENT
Start: 2020-09-23 | End: 2020-09-25 | Stop reason: HOSPADM

## 2020-09-23 RX ORDER — SODIUM CHLORIDE 0.9 % (FLUSH) 0.9 %
10 SYRINGE (ML) INJECTION EVERY 12 HOURS SCHEDULED
Status: DISCONTINUED | OUTPATIENT
Start: 2020-09-23 | End: 2020-09-24 | Stop reason: SDUPTHER

## 2020-09-23 RX ORDER — SODIUM CHLORIDE 0.9 % (FLUSH) 0.9 %
10 SYRINGE (ML) INJECTION PRN
Status: DISCONTINUED | OUTPATIENT
Start: 2020-09-23 | End: 2020-09-24 | Stop reason: SDUPTHER

## 2020-09-23 RX ADMIN — MEROPENEM 1 G: 1 INJECTION, POWDER, FOR SOLUTION INTRAVENOUS at 10:16

## 2020-09-23 RX ADMIN — ASPIRIN 300 MG: 300 SUPPOSITORY RECTAL at 16:00

## 2020-09-23 RX ADMIN — FAMOTIDINE 20 MG: 20 TABLET, FILM COATED ORAL at 10:25

## 2020-09-23 RX ADMIN — SODIUM CHLORIDE, PRESERVATIVE FREE 10 ML: 5 INJECTION INTRAVENOUS at 10:16

## 2020-09-23 RX ADMIN — SODIUM CHLORIDE, PRESERVATIVE FREE 10 ML: 5 INJECTION INTRAVENOUS at 14:15

## 2020-09-23 RX ADMIN — MEROPENEM 1 G: 1 INJECTION, POWDER, FOR SOLUTION INTRAVENOUS at 22:05

## 2020-09-23 RX ADMIN — POTASSIUM CHLORIDE 10 MEQ: 10 INJECTION, SOLUTION INTRAVENOUS at 15:58

## 2020-09-23 RX ADMIN — POTASSIUM CHLORIDE 10 MEQ: 10 INJECTION, SOLUTION INTRAVENOUS at 12:21

## 2020-09-23 RX ADMIN — PERFLUTREN 1.65 MG: 6.52 INJECTION, SUSPENSION INTRAVENOUS at 07:46

## 2020-09-23 RX ADMIN — VANCOMYCIN HYDROCHLORIDE 1000 MG: 1 INJECTION, POWDER, LYOPHILIZED, FOR SOLUTION INTRAVENOUS at 19:47

## 2020-09-23 RX ADMIN — POTASSIUM CHLORIDE 10 MEQ: 10 INJECTION, SOLUTION INTRAVENOUS at 12:15

## 2020-09-23 RX ADMIN — ACETAMINOPHEN 650 MG: 325 TABLET ORAL at 10:24

## 2020-09-23 RX ADMIN — POTASSIUM CHLORIDE 10 MEQ: 10 INJECTION, SOLUTION INTRAVENOUS at 14:13

## 2020-09-23 NOTE — PROGRESS NOTES
Speech Language Pathology  Facility/Department: [facility] [place] C3 TELE/MED SURG/ONC  Dysphagia Daily Treatment Note      Recommendations:  Solid Consistency: NPO exception of pleasure feeds puree  Liquid Consistency: NPO exception of pleasure feeds of honey-thick liquid VIA TSP  Medication: Via alternative means of nutrition, critical meds crushed in puree  *Pt is considered at a high risk of aspiration with all PO intake at this time. *MBSS would be suggested, however, pt likely unable to tolerate study at this time d/t overall fatigue and acceptance of minimal PO trials. MBSS may not be consistent with pt's POC / wishes as well? NAME: Kelly Terrell  : 1922  MRN: [id number]    Patient Diagnosis(es):   Patient Active Problem List    Diagnosis Date Noted    UTI (urinary tract infection) 2020    Acute encephalopathy     Encephalopathy, metabolic     Arterial ischemic stroke, ICA, left, acute (HCC)     Aspiration pneumonia ([address]     Sepsis secondary to UTI ([address]     Acute respiratory failure with hypoxia and hypercapnia ([address] 10/28/2019    Abnormal CT of the chest     Tracheobronchitis     Pulmonary congestion     Sepsis ([address]     QT prolongation     Cystitis with hematuria     Black stool 2016    GI bleed 2016    Acute blood loss anemia 2016    PVD (peripheral vascular disease) ([address]     Hypertension     Dementia without behavioral disturbance (HCC)      Allergies: No Known Allergies  Subjective: Pt seen upright in bed, initially asleep but woke to SLP's voice. RN OK'd SLP entry and therapy. Per RN, pt had significant difficulty with PO meds earlier this date, requiring consistent cues for swallow initiation. Pain: No c/o pain    Current Diet: Diet NPO Effective Now Exceptions are: Sips with Meds    Diet Tolerance:  Pt currently NPO    P.O. Trials:   Thin       Nectar / Mildly Thick   x x1 1/2 tsp   Honey / Moderately Thick   x x3 1/2 tsp     Pudding / Extremely Thick       Puree   x x3 1/2 tsp   Solid         Dysphagia Treatment and Impressions:  Pt on RA. Pt fatigued and benefited from cues for adequate TERE during session. PO trials discontinued prematurely d/t pt fatigue, keeping eyes closed at end of session despite sternal rub. Minimal PO trials observed during BSE completed yesterday with recommendations for dysphagia I (puree) diet with nectar-thick liquids via tsp. Pt observed with NTL via tsp with reduced bolus control, audible swallow initiation, and immediate weak cough noted post-swallow -- suspected aspiration of bolus. Continued swallow attempts noted. Pt also observed with HTL via tsp and puree via tsp with immediate weak throat clear in 1/3 puree trials. SLP cued hard cough during these instances. O2 sats remained at 92-94% throughout assessment, no change in RR throughout. Pt required max cues for voicing post-swallow, which she completed in ~50% attempts. Again, limited PO trials observed this date d/t pt overall fatigue, reduced TERE. Pt is considered at a high risk of aspiration with all PO intake at this time. Dysphagia Goals:  Timeframe for Long-term Goals: 7 days (9/29)  Goal 1: Pt will tolerate LRD w/o s/s of dysphagia. 09/23: ongoing, not progressing. See above     Short-term Goals  Timeframe for Short-term Goals: 5 days (9/27)  1) The patient will tolerate recommended diet without observed clinical signs of aspiration. 09/23: ongoing, see above. 2) The patient/caregiver will demonstrate understanding of compensatory strategies for improved swallowing safety. 09/23: pt unable to demonstrate evidence of learning, no family / caregiver currently present. RN aware of recs.     Speech/Language/Cog Goals: n/a    Recommendations:  Solid Consistency: NPO exception of pleasure feeds puree  Liquid Consistency: NPO exception of pleasure feeds of honey-thick liquid VIA TSP  Medication: Via alternative means of nutrition, critical meds crushed in puree  *Pt is considered at a high risk of aspiration with all PO intake at this time. *MBSS would be suggested, however, pt likely unable to tolerate study at this time d/t overall fatigue and acceptance of minimal PO trials. MBSS may not be consistent with pt's POC / wishes as well? Patient/Family/Caregiver Education:  SLP re: role of ST, aspiration precautions. Pt unable to demonstrate evidence of learning, has dementia per chart. RN aware of recs. Compensatory Strategies:  NPO exception of pleasure feeds of puree and HTL via tsp  Oral care 3x/day  Upright positioning as able    Plan:    Continued Dysphagia treatment with goals per plan of care. Discharge Recommendations: TBD    If pt discharges from hospital prior to Speech/Swallowing discharge, this note serves as tx and discharge summary.      Total Treatment Time / Charges     Time in Time out Total Time / units   Cognitive Tx         Speech Tx      Dysphagia Tx 1124 1139 15 min / 1 unit     Signature:  Tesfaye Smith M.S. [id number] [facility]  Speech-language pathologist  [id number]

## 2020-09-23 NOTE — PROGRESS NOTES
Bridget Ling  Neurology Follow-up  [facility]    Date of Service: 9/23/2020    Subjective:   CC: Follow up today regarding: Acute encephalopathy and new stroke. Events noted. Chart and lab reviewed. The patient had an MRI of the brain yesterday which showed acute left subcortical strokes with the mild petechial hemorrhage. She is more awake and alert today. She can follow directions. She denies any pain. Other review system was limited from the patient. ROS : A 10-12 system review obtained and updated today and is unremarkable except as mentioned  in my interval history. Family history: Noncontributory.     Past Medical History:   Diagnosis Date    Alzheimer's dementia ([facility] [address]     Anemia     Anxiety     Cataract     Bilateral    CH[facility] (congestive heart failure) ([facility])     Chronic pain     Constipation     Dementia ([facility])     Depression     Dysphagia     ESBL (extended spectrum beta-lactamase) producing bacteria infection 10/28/2019    GI bleed     Hyperlipidemia     Hypertension     Hypothyroid     Insomnia     Mood disorder ([facility])     Osteoarthritis     PVD (peripheral vascular disease) ([facility])      Current Facility-Administered Medications   Medication Dose Route Frequency Provider Last Rate Last Dose    sodium chloride flush 0.9 % injection 10 mL  10 mL Intravenous 2 times per day Jayson Ny MD        sodium chloride flush 0.9 % injection 10 mL  10 mL Intravenous PRN Jayson Ny MD        polyethylene glycol [facility]) packet 17 g  17 g Oral Daily PRN Jayson Ny MD        promethazine (PHENERGAN) tablet 12.5 mg  12.5 mg Oral Q6H PRN Jayson Ny MD        Or    ondansetron [facility]) injection 4 mg  4 mg Intravenous Q6H PRN Jayson Ny MD        aspirin EC tablet 81 mg  81 mg Oral Daily Jayson Ny MD        Or    aspirin suppository 300 mg  300 mg Rectal Daily Jayson Ny MD        labetalol (NORMODYNE;TRANDATE) injection 10 mg  10 mg Intravenous Q10 Min PRN Kimmie Davis MD        atorvastatin (LIPITOR) tablet 40 mg  40 mg Oral Nightly Kimmie Davis MD        potassium chloride 10 mEq/100 mL IVPB (Peripheral Line)  10 mEq Intravenous Q1H Kimmie Davis MD        sodium chloride flush 0.9 % injection 10 mL  10 mL Intravenous 2 times per day Radha Chafe A Howard, DO   10 mL at 09/23/20 1016    sodium chloride flush 0.9 % injection 10 mL  10 mL Intravenous PRN Shaheed Lesser, DO        acetaminophen (TYLENOL) tablet 650 mg  650 mg Oral Q6H PRN Ahmad A Howard, DO   650 mg at 09/23/20 1024    Or    acetaminophen (TYLENOL) suppository 650 mg  650 mg Rectal Q6H PRN Nonda Karlene Howard, DO        bisacodyl (DULCOLAX) EC tablet 5 mg  5 mg Oral Daily PRN Nonda Karlene Howard, DO        promethazine (PHENERGAN) tablet 12.5 mg  12.5 mg Oral Q6H PRN Ahmad A Howard, DO        Or    ondansetron (ZOFRAN) injection 4 mg  4 mg Intravenous Q6H PRN Ahmad A Howard, DO        famotidine (PEPCID) tablet 20 mg  20 mg Oral Daily Ahmad A Howard, DO   20 mg at 09/23/20 1025    meropenem (MERREM) 1 g in sodium chloride 0.9 % 100 mL IVPB (mini-bag)  1 g Intravenous Q12H Kimmie Davis MD 0 mL/hr at 09/22/20 2330 1 g at 09/23/20 1016    vancomycin (VANCOCIN) intermittent dosing (placeholder)   Other RX Placeholder Kimmie Davis MD        vancomycin 1000 mg IVPB in 250 mL D5W addavial  1,000 mg Intravenous Q24H Kimmie Davis MD   Stopped at 09/22/20 2130     No Known Allergies   reports that she has never smoked. She has never used smokeless tobacco. She reports that she does not drink alcohol or use drugs.        Objective:  Exam:   Constitutional:   Vitals:    09/23/20 0353 09/23/20 0610 09/23/20 1006 09/23/20 1106   BP: (!) 186/87  (!) 176/71 111/62   Pulse: 83  74 82   Resp: 16  20 18   Temp: 97.7 °F (36.5 °C)  101.2 °F (38.4 °C) 98.2 °F (36.8 °C)   TempSrc: Oral  Axillary Axillary   SpO2: 91%  96% 94%   Weight:  157 lb 10.1 oz (71.5 kg)     Height:         General appearance: Waxing and waning  Eye: No icterus. Neck: supple  Cardiovascular:  No lower leg edema with good pulsation. Mental Status:   AAO x1 only to herself  Poor attention and concentration but can follow simple direction  Language is nonfluent but soft  Poor recent and remote memory and fund of knowledge  Cranial Nerves:   II: V  Pupils: equal, round, reactive to light  III,IV,VI: Extra Ocular Movements are intact. No nystagmus  V: Facial sensation is intact  VII: Facial strength and movements: intact and symmetric  IX: Palate elevation is symmetric  XI: Shoulder shrug is intact  XII: Tongue movements are normal  Musculoskeletal: She does move her arms spontaneously. She has chronic leg weakness 3/5  Tone is normal in the legs  Reflexes 1+ throughout both arms and legs  Coordination no tremors  Sensation is normal  Gait cannot be tested due to generalized weakness. Tone: Normal tone. Data:  LABS:   Lab Results   Component Value Date     09/23/2020    K 3.3 09/23/2020     09/23/2020    CO2 22 09/23/2020    BUN 17 09/23/2020    CREATININE 0.7 09/23/2020    GFRAA >60 09/23/2020    GFRAA >60 01/11/2012    LABGLOM >60 09/23/2020    GLUCOSE 88 09/23/2020    PHOS 2.3 11/01/2019    MG 2.10 09/23/2020    CALCIUM 8.3 09/23/2020     Lab Results   Component Value Date    WBC 8.9 09/23/2020    RBC 4.03 09/23/2020    HGB 13.0 09/23/2020    HCT 39.3 09/23/2020    MCV 97.6 09/23/2020    RDW 12.4 09/23/2020     09/23/2020     Lab Results   Component Value Date    INR 1.22 (H) 06/02/2017    PROTIME 13.8 (H) 06/02/2017       Neuroimaging was independently reviewed by me and discussed results with the patient  I reviewed blood testing and other test results and discussed results with the patient      Impression:  Acute encephalopathy, severe.   Likely secondary to acute left hemispheric CVA in addition to metabolic encephalopathy  Baseline dementia of Alzheimer disease, severe  UTI  Generalized debilitation  HTN      Recommendation  Can start baby aspirin 81 mg daily  Continue PT and OT  Continue speech and swallow evaluation  Hydration  Follow electrolytes and replace potassium  Continue statin  Blood pressure monitor  Continue antibiotics  Echo  DC planning when medically stable  Prognosis is guarded  Will follow PRN for now                  Lolis Og MD   [phone]      This dictation was generated by voice recognition computer software. Although all attempts are made to edit the dictation for accuracy, there may be errors in the transcription that are not intended.

## 2020-09-23 NOTE — CONSULTS
Palliative Care Initial Note  Palliative Care Admit date:   9/23/2020  Reason for c/s:  [address] 64    Advance Directives:  Pts grandson, Andrea Moreno, identified himself as pts legal guardian though we do not have copies of paperwork in this EMR. He also reports pt as having executed a Living Will. He will provide copies. Pt currently has a DNRCC-A code status. Plan of care/goals:  Spoke w/ Tracy Estrada via phone, he was aware of pts acute CVA dx. We reviewed the findings of today's SLP AVTAR guerrero and rec's for NPO. PTAFlavio shared that pt was on a pruee diet but denies that she has needed to be NPO. We discussed the potential need for discussion re: PEG/NPO vs pleasure feeds/hospice, depending on subsequent SLP rec's. Tracy Estrada is very receptive to ongoing discussions. Social/Spiritual:  Pt was active w/ HOC from Nov, 2019 - Dec 18, 2019. In talking w/ guardian, he seemed to indicate that pt had been d/c from hospice due to her improved condition.       Plan: will continue to follow and have ongoing ACP d/w Tracy Estrada        Reason for consult:    _X_ Advance Care Planning  ___ Transition of Care Planning  ___ Psychosocial/Spiritual Support  ___ Symptom Management                                                                                                          Isidoro Landin RN

## 2020-09-23 NOTE — PROGRESS NOTES
rubs or gallops. Abdomen: Soft, non-tender, non-distended with normal bowel sounds. Gardiner with cloudy/purulent urine   Musculoskeletal: No clubbing, cyanosis or edema bilaterally. Skin: Skin color, texture, turgor normal.  No rashes or lesions. Neurologic:  Neurovascularly intact without any focal sensory/motor deficits. Cranial nerves: II-XII intact, grossly non-focal.  Psychiatric: sleepy but arousable. Oriented to person. Answers with 1-2 word responses   Capillary Refill: Brisk,< 3 seconds   Peripheral Pulses: +2 palpable, equal bilaterally       Labs:   Recent Labs     09/21/20 2228 09/22/20  1016 09/23/20  0546   WBC 11.3* 8.0 8.9   HGB 13.9 12.2 13.0   HCT 42.0 37.0 39.3    246 229     Recent Labs     09/21/20 2228 09/22/20  1016 09/23/20  0546   * 138 135*   K 4.2 3.8 3.3*   CL 99 105 104   CO2 24 24 22   BUN 21* 16 17   CREATININE 0.8 0.9 0.7   CALCIUM 8.9 8.2* 8.3     Recent Labs     09/21/20 2228 09/22/20  1016 09/23/20  0546   AST 38* 23 27   ALT 17 13 14   BILITOT 0.7 1.0 0.9   ALKPHOS 93 75 73     No results for input(s): INR in the last 72 hours. No results for input(s): Laverda Dancer in the last 72 hours. Urinalysis:      Lab Results   Component Value Date    NITRU POSITIVE 09/21/2020    WBCUA >100 09/21/2020    BACTERIA 4+ 09/21/2020    RBCUA 3-4 09/21/2020    BLOODU SMALL 09/21/2020    SPECGRAV 1.015 09/21/2020    GLUCOSEU Negative 09/21/2020    GLUCOSEU NEGATIVE 06/09/2010       Radiology:  XR CHEST (2 VW)   Final Result   Patchy opacities noted at the left lung base, which may reflect atelectasis,   chronic changes or airspace disease. MRI BRAIN WO CONTRAST   Final Result   1. Acute/early subacute infarction in the left basal ganglia. Augmented   magnetic susceptibility artifact within this infarction suggesting petechial   hemorrhage. 2. Parenchymal volume loss and sequela of moderate chronic microvascular   ischemic changes.   Multiple old lacunar infarctions in the jose r and   cerebellum bilaterally. 3. Motion degraded examination. The findings were sent to the Radiology Results [address] at 3:16   pm on 9/22/2020to be communicated to a licensed caregiver. CT HEAD WO CONTRAST   Final Result   1. Large area of acute to subacute nonhemorrhagic infarct involving the left   basal ganglia region and caudate nucleus. MR imaging of the brain would be   helpful to further evaluate the area of infarct. 2. Focal area of encephalomalacia, left upper jose r, consistent with an area   of old infarct         XR CHEST PORTABLE   Final Result   No acute abnormality visualized. VL DUP CAROTID BILATERAL    (Results Pending)           Assessment/Plan:    Active Hospital Problems    Diagnosis    UTI (urinary tract infection) [N39.0]    Acute encephalopathy [G93.40]    Encephalopathy, metabolic [O00.12]    Arterial ischemic stroke, ICA, left, acute (HCC) [I63.232]    Sepsis ([facility] [address] [A41.9]       Suspected sepsis POA 2/2 acute cystitis - p/w fever, tachycardia, leukocytosis, history of ESBL organisms in the past. High risk for aspiration as well   - continue empiric vanc and meropenem s 9/21, follow up blood and urine cx, prelim with E. Coli. sensitivities pending   - delirium precautions   - encouraged IS, pulmonary toilet as able     Acute/subacute cva - noted on [facility] and MRI brain. Resume ASA, statin. ECHO and carotid duplex ordered. PT/OT/ST as able. Palliative care consulted for assistance in addressing goals of care, apparently was enrolled in hospice in the past.     Acute metabolic encephalopathy - likely 2/2 the above infection and stroke in setting of underlying dementia.  Continue with delirium precautions     HTN - allowed to autoregulate, continue to monitor and PRN medications for SBP > 200       DVT Prophylaxis: lovenox   Diet: Diet NPO Effective Now Exceptions are: Sips with Meds  Code Status: DNR-CCA    PT/OT Eval Status: consulted

## 2020-09-23 NOTE — PLAN OF CARE
Problem: Falls - Risk of:  Goal: Will remain free from falls  Description: Will remain free from falls  Outcome: Ongoing  Goal: Absence of physical injury  Description: Absence of physical injury  Outcome: Ongoing     Problem: Falls - Risk of: Intervention: Assess risk factors for falls  Note: Pt is high fall risk  Intervention: Postfall assessment  Note: No falls sustained thus far this shift  Intervention: Manage a safe environment  Note: Call light within reach. Bed side table within reach. Wheels locked. Bed in lowest position. Bed check in place. Pt instructed to call out for assistance. No evidence of learning noted; reinforcement needed.      Intervention: Toileting assistance  Note: Gardiner cath in place; sabas care PRN per staff

## 2020-09-23 NOTE — PLAN OF CARE
Problem: Falls - Risk of:  Goal: Will remain free from falls  Description: Will remain free from falls  Outcome: Ongoing  Goal: Absence of physical injury  Description: Absence of physical injury  Outcome: Ongoing   Pt free from falls and injury. Bed alarm is on and working. [place]

## 2020-09-23 NOTE — FLOWSHEET NOTE
09/22/20 2304   Vital Signs   Temp 100.7 °F (38.2 °C)   Temp Source Oral     Gave pt Tylenol at 2035 for a fever. Now she still has a fever. Panfilo Maynard NP, paged and made aware.

## 2020-09-23 NOTE — PLAN OF CARE
Problem: SKIN INTEGRITY  Goal: Skin integrity is maintained or improved  9/22/2020 2020 by Ryan Lockett RN  Outcome: Ongoing   4 Eyes Skin Assessment     The patient is being assess for  Shift Handoff    I agree that 2 RN's have performed a thorough Head to Toe Skin Assessment on the patient. ALL assessment sites listed below have been assessed. Areas assessed by both nurses: Jules Meyer RN and Pa Arana RN  [x]   Head, Face, and Ears   [x]   Shoulders, Back, and Chest  [x]   Arms, Elbows, and Hands   [x]   Coccyx, Sacrum, and Ischum  [x]   Legs, Feet, and Heels        Does the Patient have Skin Breakdown?   No         Jose F Prevention initiated:  Yes   Wound Care Orders initiated:  NA      WOC nurse consulted for Pressure Injury (Stage 3,4, Unstageable, DTI, NWPT, and Complex wounds):  NA      Nurse 1 eSignature: Electronically signed by Ryan Lockett RN on 9/22/20 at 8:21 PM EDT    **SHARE this note so that the co-signing nurse is able to place an eSignature**    Nurse 2 eSignature: Electronically signed by Pa Arana RN on 9/22/20 at 8:24 PM EDT         ]

## 2020-09-23 NOTE — PROGRESS NOTES
Pt is alert and oriented to person only. Vitals signs are stable. Her BP remains elevated. Assessment is as charted. Pt denies further needs at this time. Will continue to monitor.

## 2020-09-23 NOTE — CARE COORDINATION
Chart reviewed. Spoke with Dr Allie Prado. Will follow for mentation improvement. Plan to return to [facility] when medically ready. CM following.  Joel Mason RN

## 2020-09-23 NOTE — PROGRESS NOTES
Pt A&O to self at times; otherwise disoriented to place, situation, and time. Tylenol given for axillary temp 101.2; see MAR for admin. Denies pain/discomfort; no apparent distress. Denies N/V; BS active x4. Denies dyspnea. Gardiner cath with clear, yellow urine. Tele in place. Call light within reach. Bed side table within reach. Wheels locked. Bed in lowest position. Bed check in place. Pt instructed to call out for assistance. No evidence of learning noted; reinforcement needed; increased rounding to meet pt's needs. Shift assessment complete. All care cont per orders. Will cont to monitor.    Electronically signed by Benjamín Smith RN on 9/23/2020 at 5:51 PM

## 2020-09-24 ENCOUNTER — APPOINTMENT (OUTPATIENT)
Dept: VASCULAR LAB | Age: 85
DRG: 871 | End: 2020-09-24
Payer: MEDICARE

## 2020-09-24 LAB
A/G RATIO: 1.2 (ref 1.1–2.2)
ALBUMIN SERPL-MCNC: 3.5 G/DL (ref 3.4–5)
ALP BLD-CCNC: 74 U/L (ref 40–129)
ALT SERPL-CCNC: 15 U/L (ref 10–40)
ANION GAP SERPL CALCULATED.3IONS-SCNC: 11 MMOL/L (ref 3–16)
AST SERPL-CCNC: 31 U/L (ref 15–37)
BASOPHILS ABSOLUTE: 0.1 K/UL (ref 0–0.2)
BASOPHILS RELATIVE PERCENT: 1 %
BILIRUB SERPL-MCNC: 0.8 MG/DL (ref 0–1)
BUN BLDV-MCNC: 16 MG/DL (ref 7–20)
CALCIUM SERPL-MCNC: 8.5 MG/DL (ref 8.3–10.6)
CHLORIDE BLD-SCNC: 98 MMOL/L (ref 99–110)
CHOLESTEROL, TOTAL: 157 MG/DL (ref 0–199)
CO2: 24 MMOL/L (ref 21–32)
CREAT SERPL-MCNC: 0.8 MG/DL (ref 0.6–1.2)
EOSINOPHILS ABSOLUTE: 0.1 K/UL (ref 0–0.6)
EOSINOPHILS RELATIVE PERCENT: 0.9 %
ESTIMATED AVERAGE GLUCOSE: 119.8 MG/DL
GFR AFRICAN AMERICAN: >60
GFR NON-AFRICAN AMERICAN: >60
GLOBULIN: 3 G/DL
GLUCOSE BLD-MCNC: 87 MG/DL (ref 70–99)
HBA1C MFR BLD: 5.8 %
HCT VFR BLD CALC: 38.9 % (ref 36–48)
HDLC SERPL-MCNC: 33 MG/DL (ref 40–60)
HEMOGLOBIN: 13.1 G/DL (ref 12–16)
LDL CHOLESTEROL CALCULATED: 104 MG/DL
LYMPHOCYTES ABSOLUTE: 1.9 K/UL (ref 1–5.1)
LYMPHOCYTES RELATIVE PERCENT: 22.9 %
MCH RBC QN AUTO: 32.2 PG (ref 26–34)
MCHC RBC AUTO-ENTMCNC: 33.7 G/DL (ref 31–36)
MCV RBC AUTO: 95.7 FL (ref 80–100)
MONOCYTES ABSOLUTE: 1.2 K/UL (ref 0–1.3)
MONOCYTES RELATIVE PERCENT: 14.4 %
NEUTROPHILS ABSOLUTE: 5.1 K/UL (ref 1.7–7.7)
NEUTROPHILS RELATIVE PERCENT: 60.8 %
PDW BLD-RTO: 12.3 % (ref 12.4–15.4)
PLATELET # BLD: 236 K/UL (ref 135–450)
PMV BLD AUTO: 9.5 FL (ref 5–10.5)
POTASSIUM REFLEX MAGNESIUM: 3.8 MMOL/L (ref 3.5–5.1)
RBC # BLD: 4.07 M/UL (ref 4–5.2)
SODIUM BLD-SCNC: 133 MMOL/L (ref 136–145)
TOTAL PROTEIN: 6.5 G/DL (ref 6.4–8.2)
TRIGL SERPL-MCNC: 102 MG/DL (ref 0–150)
VANCOMYCIN RANDOM: 8 UG/ML
VLDLC SERPL CALC-MCNC: 20 MG/DL
WBC # BLD: 8.5 K/UL (ref 4–11)

## 2020-09-24 PROCEDURE — 2580000003 HC RX 258: Performed by: INTERNAL MEDICINE

## 2020-09-24 PROCEDURE — 6360000002 HC RX W HCPCS: Performed by: INTERNAL MEDICINE

## 2020-09-24 PROCEDURE — 36415 COLL VENOUS BLD VENIPUNCTURE: CPT

## 2020-09-24 PROCEDURE — 80053 COMPREHEN METABOLIC PANEL: CPT

## 2020-09-24 PROCEDURE — 6370000000 HC RX 637 (ALT 250 FOR IP)

## 2020-09-24 PROCEDURE — 83036 HEMOGLOBIN GLYCOSYLATED A1C: CPT

## 2020-09-24 PROCEDURE — 80061 LIPID PANEL: CPT

## 2020-09-24 PROCEDURE — 6370000000 HC RX 637 (ALT 250 FOR IP): Performed by: INTERNAL MEDICINE

## 2020-09-24 PROCEDURE — 93880 EXTRACRANIAL BILAT STUDY: CPT

## 2020-09-24 PROCEDURE — 85025 COMPLETE CBC W/AUTO DIFF WBC: CPT

## 2020-09-24 PROCEDURE — 1200000000 HC SEMI PRIVATE

## 2020-09-24 PROCEDURE — 6360000002 HC RX W HCPCS: Performed by: NURSE PRACTITIONER

## 2020-09-24 PROCEDURE — 80202 ASSAY OF VANCOMYCIN: CPT

## 2020-09-24 RX ORDER — MORPHINE SULFATE 2 MG/ML
1 INJECTION, SOLUTION INTRAMUSCULAR; INTRAVENOUS
Status: DISCONTINUED | OUTPATIENT
Start: 2020-09-24 | End: 2020-09-25 | Stop reason: HOSPADM

## 2020-09-24 RX ADMIN — SODIUM CHLORIDE, PRESERVATIVE FREE 10 ML: 5 INJECTION INTRAVENOUS at 10:52

## 2020-09-24 RX ADMIN — ATORVASTATIN CALCIUM 40 MG: 40 TABLET, FILM COATED ORAL at 22:02

## 2020-09-24 RX ADMIN — ACETAMINOPHEN 650 MG: 650 SUPPOSITORY RECTAL at 00:56

## 2020-09-24 RX ADMIN — MEROPENEM 1 G: 1 INJECTION, POWDER, FOR SOLUTION INTRAVENOUS at 22:01

## 2020-09-24 RX ADMIN — Medication: at 07:54

## 2020-09-24 RX ADMIN — SODIUM CHLORIDE, PRESERVATIVE FREE 10 ML: 5 INJECTION INTRAVENOUS at 10:34

## 2020-09-24 RX ADMIN — MEROPENEM 1 G: 1 INJECTION, POWDER, FOR SOLUTION INTRAVENOUS at 10:30

## 2020-09-24 RX ADMIN — SODIUM CHLORIDE, PRESERVATIVE FREE 10 ML: 5 INJECTION INTRAVENOUS at 22:07

## 2020-09-24 RX ADMIN — MORPHINE SULFATE 1 MG: 2 INJECTION, SOLUTION INTRAMUSCULAR; INTRAVENOUS at 11:16

## 2020-09-24 ASSESSMENT — PAIN SCALES - GENERAL
PAINLEVEL_OUTOF10: 4
PAINLEVEL_OUTOF10: 0
PAINLEVEL_OUTOF10: 5

## 2020-09-24 ASSESSMENT — PAIN DESCRIPTION - LOCATION: LOCATION: BACK

## 2020-09-24 ASSESSMENT — PAIN DESCRIPTION - PAIN TYPE: TYPE: ACUTE PAIN;CHRONIC PAIN

## 2020-09-24 NOTE — PROGRESS NOTES
Hospitalist Progress Note      PCP: Tammie Antony MD    Date of Admission: 9/21/2020    Chief Complaint: confusion     Hospital Course:     79 yo F with history of recurrent UTIs, dementia presented with confusion and fever, found to have acute/subacute CVA and likely UTI     Subjective: Doesn't verbalize much. Says she in pain with turning. Medications:  Reviewed    Infusion Medications   Scheduled Medications    sodium chloride flush  10 mL Intravenous 2 times per day    aspirin  81 mg Oral Daily    Or    aspirin  300 mg Rectal Daily    atorvastatin  40 mg Oral Nightly    sodium chloride flush  10 mL Intravenous 2 times per day    famotidine  20 mg Oral Daily    meropenem  1 g Intravenous Q12H    vancomycin (VANCOCIN) intermittent dosing (placeholder)   Other RX Placeholder     PRN Meds: morphine, sodium chloride flush, polyethylene glycol, promethazine **OR** ondansetron, labetalol, sodium chloride flush, acetaminophen **OR** acetaminophen, bisacodyl, promethazine **OR** ondansetron      Intake/Output Summary (Last 24 hours) at 9/24/2020 1647  Last data filed at 9/24/2020 1215  Gross per 24 hour   Intake 700 ml   Output 400 ml   Net 300 ml       Physical Exam Performed:    BP (!) 167/65   Pulse 87   Temp 99.3 °F (37.4 °C) (Axillary)   Resp 14   Ht 5' 5\" (1.651 m)   Wt 147 lb 11.3 oz (67 kg)   SpO2 95%   BMI 24.58 kg/m²     General appearance:  Elderly, chronically ill appearing   HEENT: Pupils equal, round, and reactive to light. Conjunctivae/corneas clear. Neck: Supple, with full range of motion. No jugular venous distention. Trachea midline. Respiratory:  Normal respiratory effort. Clear to auscultation, bilaterally without Rales/Wheezes/Rhonchi. Cardiovascular: Regular rate and rhythm with normal S1/S2 without murmurs, rubs or gallops. Abdomen: Soft, non-tender, non-distended with normal bowel sounds.  Gardiner with cloudy/purulent urine   Musculoskeletal: No clubbing, cyanosis or edema bilaterally. Skin: Skin color, texture, turgor normal.  No rashes or lesions. Neurologic:  Neurovascularly intact without any focal sensory/motor deficits. Cranial nerves: II-XII intact, grossly non-focal.  Psychiatric: sleepy but arousable. Oriented to person. Answers with 1-2 word responses   Capillary Refill: Brisk,< 3 seconds   Peripheral Pulses: +2 palpable, equal bilaterally       Labs:   Recent Labs     09/22/20  1016 09/23/20  0546 09/24/20  0728   WBC 8.0 8.9 8.5   HGB 12.2 13.0 13.1   HCT 37.0 39.3 38.9    229 236     Recent Labs     09/22/20  1016 09/23/20  0546 09/24/20  0728    135* 133*   K 3.8 3.3* 3.8    104 98*   CO2 24 22 24   BUN 16 17 16   CREATININE 0.9 0.7 0.8   CALCIUM 8.2* 8.3 8.5     Recent Labs     09/22/20  1016 09/23/20  0546 09/24/20  0728   AST 23 27 31   ALT 13 14 15   BILITOT 1.0 0.9 0.8   ALKPHOS 75 73 74     No results for input(s): INR in the last 72 hours. No results for input(s): Marv Smoker in the last 72 hours. Urinalysis:      Lab Results   Component Value Date    NITRU POSITIVE 09/21/2020    WBCUA >100 09/21/2020    BACTERIA 4+ 09/21/2020    RBCUA 3-4 09/21/2020    BLOODU SMALL 09/21/2020    SPECGRAV 1.015 09/21/2020    GLUCOSEU Negative 09/21/2020    GLUCOSEU NEGATIVE 06/09/2010       Radiology:  VL DUP CAROTID BILATERAL         XR CHEST (2 VW)   Final Result   Patchy opacities noted at the left lung base, which may reflect atelectasis,   chronic changes or airspace disease. MRI BRAIN WO CONTRAST   Final Result   1. Acute/early subacute infarction in the left basal ganglia. Augmented   magnetic susceptibility artifact within this infarction suggesting petechial   hemorrhage. 2. Parenchymal volume loss and sequela of moderate chronic microvascular   ischemic changes. Multiple old lacunar infarctions in the jose r and   cerebellum bilaterally. 3. Motion degraded examination.    The findings were sent to the Radiology Results Communication Center at 3:16   pm on 9/22/2020to be communicated to a licensed caregiver. CT HEAD WO CONTRAST   Final Result   1. Large area of acute to subacute nonhemorrhagic infarct involving the left   basal ganglia region and caudate nucleus. MR imaging of the brain would be   helpful to further evaluate the area of infarct. 2. Focal area of encephalomalacia, left upper jose r, consistent with an area   of old infarct         XR CHEST PORTABLE   Final Result   No acute abnormality visualized. Assessment/Plan:    Active Hospital Problems    Diagnosis    UTI (urinary tract infection) [N39.0]    Acute encephalopathy [G93.40]    Encephalopathy, metabolic [C63.23]    Arterial ischemic stroke, ICA, left, acute (HCC) [I63.232]    Sepsis ([facility] [address] [A41.9]       Suspected sepsis POA 2/2 acute cystitis - p/w fever, tachycardia, leukocytosis, history of ESBL organisms in the past. High risk for aspiration as well   - continue empiric vanc and meropenem s 9/21, follow up blood and urine cx, prelim with E. Coli. sensitivities pending   - delirium precautions   - encouraged IS, pulmonary toilet as able     Acute/subacute cva - noted on [facility] and MRI brain.   - Resume ASA, statin.  - ECHO - EF 55-60% with grade I DD.    - CUS - pending.  - palliative care consulted - discussed Hospice with sonLa. Would like to proceed - see ACP note. Acute metabolic encephalopathy - likely 2/2 the above infection and stroke in setting of underlying dementia. Continue with delirium precautions     HTN - allowed to autoregulate, continue to monitor and PRN medications for SBP > 200     DVT Prophylaxis: lovenox   Diet: Diet NPO Effective Now Exceptions are: Sips with Meds  Code Status: DNR-CCA    PT/OT Eval Status: consulted     Dispo - pending hospice evaluation - could return to [facility] with [address] [facility] in [facility].     JUAN C Diallo - CNP

## 2020-09-24 NOTE — ACP (ADVANCE CARE PLANNING)
Advance Care Planning Note    Name: William Muñoz  YOB: 1922  MRN: [id number]  Admission Date: 9/21/2020 10:06 PM    Date of Discussion: 9/24/2020    Active Diagnoses:    Sepsis  UTI  Acute CVA  Dementia  Dysphagia    These active diagnoses are of sufficient risk that focused discussion on advance care planning is indicated in order to allow the patient to thoughtfully consider personal goals of care; and, if situations arise that prevent the ability to personally give input, to ensure appropriate representation of their personal desires for different levels and agressiveness of care. Discussion:    Persons present and participating in discussion: Suman Rodriguez. Patient's decisional capacity or surrogate:  Surrogate    Discussion in summary: The patient was admitted with sepsis from a UTI. She was also found to have an acute CVA, leaving her with dysphagia. SLP evaluated the patient and recommended NPO. I discussed this with Rainell Baumgarten and Fermin Saini did as well. Rainell Baumgarten states that the patient has a history of pulling out devices and does not feel as though a PEG would be appropriate. Bautista Duckworth was with Hospice in 2019 and apparently was discharged from the program when she clinically improved. Rainell Baumgarten would like a referral to [facility] and for the patient to return to [facility] with Hospice services in place. Code status: DNR-CCA    Time Spent: 6929 - 9004    Total time spent face-to-face in education and discussion: 16  minutes.   Electronically signed by JUAN C Ochoa CNP on 9/24/2020 at 4:59 PM

## 2020-09-24 NOTE — CARE COORDINATION
Chart reviewed day 2. Spoke with Jostin Clark NP. Would like referral to [facility]. Spoke with Mariely Kahn she will notify. Plan to return to [facility] with hospice care.  Jazmyn Nunez RN

## 2020-09-24 NOTE — PLAN OF CARE
Problem: SKIN INTEGRITY  Goal: Skin integrity is maintained or improved  Outcome: Ongoing     4 Eyes Skin Assessment     The patient is being assess for  Shift Handoff    I agree that 2 RN's have performed a thorough Head to Toe Skin Assessment on the patient. ALL assessment sites listed below have been assessed. Areas assessed by both nurses: Shashi Carias RN and Eliezer Segura RN  [x]   Head, Face, and Ears   [x]   Shoulders, Back, and Chest  [x]   Arms, Elbows, and Hands   [x]   Coccyx, Sacrum, and Ischum  [x]   Legs, Feet, and Heels        Does the Patient have Skin Breakdown?   No         Jose F Prevention initiated:  Yes   Wound Care Orders initiated:  NA      [facility] nurse consulted for Pressure Injury (Stage 3,4, Unstageable, DTI, NWPT, and Complex wounds):  NA      Nurse 1 eSignature: Electronically signed by Amna Best RN on 9/23/20 at 22:30 PM EDT    **SHARE this note so that the co-signing nurse is able to place an eSignature**    Nurse 2 eSignature: {Esignature:[id number]:::0}

## 2020-09-24 NOTE — PROGRESS NOTES
Pt is alert and oriented to person only. Vitals signs are stable. Assessment is as charted. Pt remains NPO to reduce risk of aspiration. Pt denies further needs at this time. Will continue to monitor.

## 2020-09-24 NOTE — PROGRESS NOTES
Speech Language Pathology    SLP reviewed pt's chart, spoke with RN. Pt / pt's family pursuing hospice care at this time. Per RN, PO meds held this AM and pt unable to form labial seal on tsp. ST to sign-off at this time, please re-refer if changes occur.     Rachid Blackwell M.S. Jemal Haddad  Speech-language pathologist  [phone]

## 2020-09-24 NOTE — PROGRESS NOTES
Pt awakens to speech and stimulation. Swabbed pts mouth, put wet spoon in pts mouth, pt is not swallowing at time. Pt moans when she was turned, moved her arm to do BP, and when I messed with her IV. When asked if she was in pain she said yes. Gave Morphine. Pt mumbles but it is hard to understand.

## 2020-09-24 NOTE — PLAN OF CARE
Palliative Care Progress Note  Palliative Care Admit date:  9/23/2020    Plan of care/goals:   Rec'd f/u call from CM to alert to fact that guardian had spoken w/ hospitalist and decision has been made for hospice. Spoke w/ guardian and he affirmed his wish to re-admit to [facility]. Ref called. Plan:  HOC will contact family to arrange a mtg for consents.       Reason for consult:    ___ Advance Care Planning  _X_ Transition of Care Planning  ___ Psychosocial/Spiritual Support  ___ Symptom Management                                                                                                                                            Bryce Porter RN

## 2020-09-25 VITALS
HEIGHT: 65 IN | RESPIRATION RATE: 21 BRPM | WEIGHT: 150.57 LBS | BODY MASS INDEX: 25.09 KG/M2 | DIASTOLIC BLOOD PRESSURE: 57 MMHG | SYSTOLIC BLOOD PRESSURE: 136 MMHG | TEMPERATURE: 97.6 F | OXYGEN SATURATION: 90 % | HEART RATE: 82 BPM

## 2020-09-25 LAB
BLOOD CULTURE, ROUTINE: NORMAL
CULTURE, BLOOD 2: NORMAL
ORGANISM: ABNORMAL
URINE CULTURE, ROUTINE: ABNORMAL

## 2020-09-25 PROCEDURE — 2580000003 HC RX 258: Performed by: INTERNAL MEDICINE

## 2020-09-25 PROCEDURE — 6370000000 HC RX 637 (ALT 250 FOR IP): Performed by: INTERNAL MEDICINE

## 2020-09-25 PROCEDURE — 6360000002 HC RX W HCPCS: Performed by: NURSE PRACTITIONER

## 2020-09-25 PROCEDURE — 6360000002 HC RX W HCPCS: Performed by: INTERNAL MEDICINE

## 2020-09-25 RX ORDER — LORAZEPAM 2 MG/ML
1 CONCENTRATE ORAL EVERY 4 HOURS PRN
Qty: 30 ML | Refills: 0 | Status: SHIPPED | OUTPATIENT
Start: 2020-09-25 | End: 2020-09-28

## 2020-09-25 RX ORDER — MORPHINE SULFATE 100 MG/5ML
5 SOLUTION ORAL
Qty: 30 ML | Refills: 0 | Status: SHIPPED | OUTPATIENT
Start: 2020-09-25 | End: 2020-09-28

## 2020-09-25 RX ADMIN — MEROPENEM 1 G: 1 INJECTION, POWDER, FOR SOLUTION INTRAVENOUS at 10:43

## 2020-09-25 RX ADMIN — FAMOTIDINE 20 MG: 20 TABLET, FILM COATED ORAL at 10:47

## 2020-09-25 RX ADMIN — MORPHINE SULFATE 1 MG: 2 INJECTION, SOLUTION INTRAMUSCULAR; INTRAVENOUS at 13:27

## 2020-09-25 RX ADMIN — SODIUM CHLORIDE, PRESERVATIVE FREE 10 ML: 5 INJECTION INTRAVENOUS at 10:48

## 2020-09-25 RX ADMIN — ASPIRIN 81 MG: 81 TABLET, COATED ORAL at 10:52

## 2020-09-25 ASSESSMENT — PAIN SCALES - GENERAL
PAINLEVEL_OUTOF10: 0
PAINLEVEL_OUTOF10: 1

## 2020-09-25 ASSESSMENT — PAIN DESCRIPTION - PAIN TYPE: TYPE: CHRONIC PAIN;ACUTE PAIN

## 2020-09-25 NOTE — PROGRESS NOTES
PS Ilene Nunez NP, \"Pt DNR-[facility] and family decided to go hospice today. Can we d/c tele? Thank you. \"      [id number] Ilene Nunez NP replied, \"Yes. \"    Tele box removed and [facility] notified.

## 2020-09-25 NOTE — PROGRESS NOTES
Pt assessment completed and charted. VSS. Pt responds to yes or no questions but not extremely verbal. Pt said no when asked if in pain. Pt ate 10 bites of applesauce, but refused water. F/C draining clear yellow urine. Pt turned every two hours. Bed in lowest position and wheels locked. Call light within reach. Bedside table within reach. Non-skid footwear in place. Pt denies any other needs at this time. Will continue to monitor.

## 2020-09-25 NOTE — PLAN OF CARE
Problem: Falls - Risk of:  Goal: Will remain free from falls  Description: Will remain free from falls  Outcome: Ongoing    Pt remains free from fall or injury. Nonskid socks on. Bedside table and call light within reach. Bed alarm on. Will continue to monitor.

## 2020-09-25 NOTE — CARE COORDINATION
Case Management Discharge Summary- Hospice Discharge    Destination:   [address] name and contact: [address] arrangements: no differ to Manuel Coon DNR signed and placed in discharge packet AND patient's hard chart. (This is required for DNR patients.) yes    Signed ECOC/AVS faxed to above agency/facility done, per [facility] liaison     Transportation arrangements per Hospice RN. Yes  Transport agency name and  time: 1 pm     Transport form completed and signed by:  [facility] liaison  Transport form placed with discharge packet: Yes    Notified Family:Per [facility]  Contact name: Joesph Birchwood     Patient's RN notified of plan: Aster     Note:    Discharging nurse to complete nursing portion of ECOC, reconcile AVS, place final copy with patient's discharge packet. RN to ensure signed prescriptions are sent home with patient or the facility as per nursing protocol.   BIGG Mckay

## 2020-09-27 LAB
BLOOD CULTURE, ROUTINE: NORMAL
CULTURE, BLOOD 2: NORMAL

## 2020-09-30 NOTE — DISCHARGE SUMMARY
Hospital Medicine Discharge Summary    Patient ID: Crystal Farooq      Patient's PCP: Kiana Felix MD    Admit Date: 9/21/2020     Discharge Date: 9/25/2020      Admitting Physician: Rebecca Clemente DO     Discharge Physician: JUAN C Chauhan - CNP     Discharge Diagnoses: Active Hospital Problems    Diagnosis    UTI (urinary tract infection) [N39.0]    Acute encephalopathy [G93.40]    Encephalopathy, metabolic [R66.56]    Arterial ischemic stroke, ICA, left, acute ([facility] [address] [I63.232]    Sepsis ([facility] [address] [A41.9]       The patient was seen and examined on day of discharge and this discharge summary is in conjunction with any daily progress note from day of discharge. Hospital Course:     81 yo F with history of recurrent UTIs, dementia presented with confusion and fever, found to have acute/subacute CVA and likely UTI. Suspected sepsis POA 2/2 acute cystitis - p/w fever, tachycardia, leukocytosis, history of ESBL organisms in the past. High risk for aspiration as well   - urine revealed ESBL - but family is pursuing Hospice care, so will not pursue aggressive treatment with PICC. - delirium precautions      Acute/subacute cva - noted on CTH and MRI brain.   - Resume ASA, statin.  - ECHO - EF 55-60% with grade I DD.    - CUS - less than 50% stenosis bilaterally. - palliative care consulted - discussed Hospice with Abbey neri. Would like to proceed - see ACP note.     Acute metabolic encephalopathy - likely 2/2 the above infection and stroke in setting of underlying dementia.  Continue with delirium precautions      HTN - allowed to autoregulate, continue to monitor and PRN medications for SBP > 200     Physical Exam Performed:     BP (!) 136/57   Pulse 82   Temp 97.6 °F (36.4 °C) (Oral)   Resp 21   Ht 5' 5\" (1.651 m)   Wt 150 lb 9.2 oz (68.3 kg)   SpO2 90%   BMI 25.06 kg/m²     General appearance:  Elderly, chronically ill appearing   HEENT: Pupils equal, round, and reactive to light. Conjunctivae/corneas clear. Neck: Supple, with full range of motion. No jugular venous distention. Trachea midline. Respiratory:  Normal respiratory effort. Clear to auscultation, bilaterally without Rales/Wheezes/Rhonchi. Cardiovascular: Regular rate and rhythm with normal S1/S2 without murmurs, rubs or gallops. Abdomen: Soft, non-tender, non-distended with normal bowel sounds. Gardiner with cloudy/purulent urine   Musculoskeletal: No clubbing, cyanosis or edema bilaterally. Skin: Skin color, texture, turgor normal.  No rashes or lesions. Neurologic:  Neurovascularly intact without any focal sensory/motor deficits. Cranial nerves: II-XII intact, grossly non-focal.  Psychiatric: sleepy but arousable. Oriented to person. Answers with 1-2 word responses   Capillary Refill: Brisk,< 3 seconds   Peripheral Pulses: +2 palpable, equal bilaterally            Labs: For convenience and continuity at follow-up the following most recent labs are provided:      CBC:    Lab Results   Component Value Date    WBC 8.5 09/24/2020    HGB 13.1 09/24/2020    HCT 38.9 09/24/2020     09/24/2020       Renal:    Lab Results   Component Value Date     09/24/2020    K 3.8 09/24/2020    CL 98 09/24/2020    CO2 24 09/24/2020    BUN 16 09/24/2020    CREATININE 0.8 09/24/2020    CALCIUM 8.5 09/24/2020    PHOS 2.3 11/01/2019         Significant Diagnostic Studies    Radiology:   VL DUP CAROTID BILATERAL   Final Result      XR CHEST (2 VW)   Final Result   Patchy opacities noted at the left lung base, which may reflect atelectasis,   chronic changes or airspace disease. MRI BRAIN WO CONTRAST   Final Result   1. Acute/early subacute infarction in the left basal ganglia. Augmented   magnetic susceptibility artifact within this infarction suggesting petechial   hemorrhage. 2. Parenchymal volume loss and sequela of moderate chronic microvascular   ischemic changes.   Multiple old lacunar infarctions in the jose r and cerebellum bilaterally. 3. Motion degraded examination. The findings were sent to the Radiology Results [address] at 3:16   pm on 9/22/2020to be communicated to a licensed caregiver. CT HEAD WO CONTRAST   Final Result   1. Large area of acute to subacute nonhemorrhagic infarct involving the left   basal ganglia region and caudate nucleus. MR imaging of the brain would be   helpful to further evaluate the area of infarct. 2. Focal area of encephalomalacia, left upper jose r, consistent with an area   of old infarct         XR CHEST PORTABLE   Final Result   No acute abnormality visualized. Consults:     IP CONSULT TO HOSPITALIST  IP CONSULT TO NEUROLOGY  PHARMACY TO DOSE VANCOMYCIN  IP CONSULT TO PALLIATIVE CARE  IP CONSULT TO HOSPICE    Disposition:  To [facility]     Condition at Discharge: Stable    Discharge Instructions/Follow-up:  F/u with physician at [facility]. Code Status:  DNR-CC    Activity: activity as tolerated    Diet: pleasure feeds only - high risk for aspiration. Discharge Medications:     Discharge Medication List as of 9/25/2020  1:48 PM           Details   morphine sulfate 20 MG/ML concentrated oral solution Take 0.25 mLs by mouth every 2 hours as needed for Pain for up to 3 days. , Disp-30 mL,R-0Print      LORazepam (LORAZEPAM INTENSOL) 2 MG/ML concentrated solution Take 0.5 mLs by mouth every 4 hours as needed (restlessness, anxiety) for up to 3 days. , Disp-30 mL,R-0Print              Details   miconazole (MICOTIN) 2 % powder Apply topically 2 times daily Apply topically 2 times daily. , Topical, 2 TIMES DAILY, Historical Med      acetaminophen (TYLENOL) 325 MG tablet Take 650 mg by mouth every 6 hours as needed.         albuterol (PROVENTIL) (5 MG/ML) 0.5% nebulizer solution Take 2.5 mg by nebulization every 6 hours as needed for Wheezing      ipratropium-albuterol (DUONEB) 0.5-2.5 (3) MG/3ML SOLN nebulizer solution Inhale 1 vial into the lungs every 4 hours as needed Historical Med      miconazole (MICOTIN) 2 % cream Apply 1 applicator topically 2 times daily Apply topically 2 times daily. , Topical, Historical Med      Skin Protectants, Misc. (EUCERIN) cream Apply  topically as needed. Apply to bilateral lower extremities twice weekly after showering/bathing (Wed/Sat). , Topical, Historical Med             Time Spent on discharge is more than 30 minutes in the examination, evaluation, counseling and review of medications and discharge plan. Signed:    Silvia Judd APRN - CNP   9/30/2020      Thank you Rachel Da Silva MD for the opportunity to be involved in this patient's care. If you have any questions or concerns please feel free to contact me at [phone].

## 2020-10-22 PROBLEM — N39.0 UTI (URINARY TRACT INFECTION): Status: RESOLVED | Noted: 2020-09-22 | Resolved: 2020-10-22
